# Patient Record
Sex: FEMALE | Race: WHITE | NOT HISPANIC OR LATINO | Employment: UNEMPLOYED | URBAN - METROPOLITAN AREA
[De-identification: names, ages, dates, MRNs, and addresses within clinical notes are randomized per-mention and may not be internally consistent; named-entity substitution may affect disease eponyms.]

---

## 2024-01-20 ENCOUNTER — HOSPITAL ENCOUNTER (INPATIENT)
Facility: HOSPITAL | Age: 74
LOS: 2 days | Discharge: HOME | DRG: 321 | End: 2024-01-22
Attending: ANESTHESIOLOGY | Admitting: INTERNAL MEDICINE
Payer: MEDICARE

## 2024-01-20 ENCOUNTER — APPOINTMENT (OUTPATIENT)
Dept: RADIOLOGY | Facility: HOSPITAL | Age: 74
DRG: 321 | End: 2024-01-20
Payer: MEDICARE

## 2024-01-20 ENCOUNTER — APPOINTMENT (OUTPATIENT)
Dept: CARDIOLOGY | Facility: HOSPITAL | Age: 74
DRG: 321 | End: 2024-01-20
Payer: MEDICARE

## 2024-01-20 ENCOUNTER — HOSPITAL ENCOUNTER (EMERGENCY)
Facility: HOSPITAL | Age: 74
Discharge: OTHER INSTITUTION | DRG: 321 | End: 2024-01-20
Attending: STUDENT IN AN ORGANIZED HEALTH CARE EDUCATION/TRAINING PROGRAM
Payer: MEDICARE

## 2024-01-20 VITALS
TEMPERATURE: 98.2 F | HEART RATE: 98 BPM | WEIGHT: 180 LBS | RESPIRATION RATE: 22 BRPM | SYSTOLIC BLOOD PRESSURE: 112 MMHG | BODY MASS INDEX: 30.73 KG/M2 | HEIGHT: 64 IN | DIASTOLIC BLOOD PRESSURE: 77 MMHG | OXYGEN SATURATION: 96 %

## 2024-01-20 DIAGNOSIS — I21.4 NSTEMI (NON-ST ELEVATED MYOCARDIAL INFARCTION) (MULTI): Primary | ICD-10-CM

## 2024-01-20 DIAGNOSIS — I24.9 ACUTE CORONARY SYNDROME (MULTI): ICD-10-CM

## 2024-01-20 DIAGNOSIS — I25.112 ATHEROSCLEROTIC HEART DISEASE OF NATIVE CORONARY ARTERY WITH REFRACTORY ANGINA PECTORIS (CMS-HCC): ICD-10-CM

## 2024-01-20 DIAGNOSIS — E78.2 MIXED HYPERLIPIDEMIA: ICD-10-CM

## 2024-01-20 DIAGNOSIS — I22.2 SUBSEQUENT NON-ST ELEVATION (NSTEMI) MYOCARDIAL INFARCTION (MULTI): ICD-10-CM

## 2024-01-20 DIAGNOSIS — D68.59 PROTEIN S DEFICIENCY (MULTI): ICD-10-CM

## 2024-01-20 DIAGNOSIS — I25.110 CORONARY ARTERY DISEASE INVOLVING NATIVE CORONARY ARTERY OF NATIVE HEART WITH UNSTABLE ANGINA PECTORIS (MULTI): ICD-10-CM

## 2024-01-20 LAB
ALBUMIN SERPL-MCNC: 4.6 G/DL (ref 3.5–5)
ALP BLD-CCNC: 103 U/L (ref 35–125)
ALT SERPL-CCNC: 20 U/L (ref 5–40)
ANION GAP SERPL CALC-SCNC: 11 MMOL/L
APTT PPP: 33.3 SECONDS (ref 22–32.5)
APTT PPP: 92.7 SECONDS (ref 22–32.5)
AST SERPL-CCNC: 24 U/L (ref 5–40)
BASOPHILS # BLD AUTO: 0.03 X10*3/UL (ref 0–0.1)
BASOPHILS NFR BLD AUTO: 0.7 %
BILIRUB SERPL-MCNC: 0.3 MG/DL (ref 0.1–1.2)
BUN SERPL-MCNC: 16 MG/DL (ref 8–25)
CALCIUM SERPL-MCNC: 10 MG/DL (ref 8.5–10.4)
CHLORIDE SERPL-SCNC: 98 MMOL/L (ref 97–107)
CO2 SERPL-SCNC: 26 MMOL/L (ref 24–31)
CREAT SERPL-MCNC: 0.8 MG/DL (ref 0.4–1.6)
EGFRCR SERPLBLD CKD-EPI 2021: 78 ML/MIN/1.73M*2
EOSINOPHIL # BLD AUTO: 0.06 X10*3/UL (ref 0–0.4)
EOSINOPHIL NFR BLD AUTO: 1.4 %
ERYTHROCYTE [DISTWIDTH] IN BLOOD BY AUTOMATED COUNT: 12.3 % (ref 11.5–14.5)
GLUCOSE SERPL-MCNC: 137 MG/DL (ref 65–99)
HCT VFR BLD AUTO: 44.5 % (ref 36–46)
HGB BLD-MCNC: 14.9 G/DL (ref 12–16)
IMM GRANULOCYTES # BLD AUTO: 0.01 X10*3/UL (ref 0–0.5)
IMM GRANULOCYTES NFR BLD AUTO: 0.2 % (ref 0–0.9)
INR PPP: 1.3 (ref 0.9–1.2)
LYMPHOCYTES # BLD AUTO: 1.46 X10*3/UL (ref 0.8–3)
LYMPHOCYTES NFR BLD AUTO: 33.1 %
MCH RBC QN AUTO: 32.5 PG (ref 26–34)
MCHC RBC AUTO-ENTMCNC: 33.5 G/DL (ref 32–36)
MCV RBC AUTO: 97 FL (ref 80–100)
MONOCYTES # BLD AUTO: 0.32 X10*3/UL (ref 0.05–0.8)
MONOCYTES NFR BLD AUTO: 7.3 %
NEUTROPHILS # BLD AUTO: 2.53 X10*3/UL (ref 1.6–5.5)
NEUTROPHILS NFR BLD AUTO: 57.3 %
NRBC BLD-RTO: 0 /100 WBCS (ref 0–0)
PLATELET # BLD AUTO: 147 X10*3/UL (ref 150–450)
POTASSIUM SERPL-SCNC: 4.2 MMOL/L (ref 3.4–5.1)
PROT SERPL-MCNC: 7.8 G/DL (ref 5.9–7.9)
PROTHROMBIN TIME: 13.7 SECONDS (ref 9.3–12.7)
RBC # BLD AUTO: 4.59 X10*6/UL (ref 4–5.2)
SODIUM SERPL-SCNC: 135 MMOL/L (ref 133–145)
TROPONIN T SERPL-MCNC: 312 NG/L
TROPONIN T SERPL-MCNC: 929 NG/L
TROPONIN T SERPL-MCNC: 974 NG/L
WBC # BLD AUTO: 4.4 X10*3/UL (ref 4.4–11.3)

## 2024-01-20 PROCEDURE — 85025 COMPLETE CBC W/AUTO DIFF WBC: CPT | Performed by: STUDENT IN AN ORGANIZED HEALTH CARE EDUCATION/TRAINING PROGRAM

## 2024-01-20 PROCEDURE — 2500000004 HC RX 250 GENERAL PHARMACY W/ HCPCS (ALT 636 FOR OP/ED): Performed by: NURSE PRACTITIONER

## 2024-01-20 PROCEDURE — 84484 ASSAY OF TROPONIN QUANT: CPT | Performed by: INTERNAL MEDICINE

## 2024-01-20 PROCEDURE — 85610 PROTHROMBIN TIME: CPT | Performed by: INTERNAL MEDICINE

## 2024-01-20 PROCEDURE — 85730 THROMBOPLASTIN TIME PARTIAL: CPT | Performed by: NURSE PRACTITIONER

## 2024-01-20 PROCEDURE — 2500000004 HC RX 250 GENERAL PHARMACY W/ HCPCS (ALT 636 FOR OP/ED): Performed by: STUDENT IN AN ORGANIZED HEALTH CARE EDUCATION/TRAINING PROGRAM

## 2024-01-20 PROCEDURE — C9113 INJ PANTOPRAZOLE SODIUM, VIA: HCPCS | Performed by: INTERNAL MEDICINE

## 2024-01-20 PROCEDURE — 71275 CT ANGIOGRAPHY CHEST: CPT

## 2024-01-20 PROCEDURE — 36415 COLL VENOUS BLD VENIPUNCTURE: CPT | Performed by: STUDENT IN AN ORGANIZED HEALTH CARE EDUCATION/TRAINING PROGRAM

## 2024-01-20 PROCEDURE — 84484 ASSAY OF TROPONIN QUANT: CPT | Performed by: STUDENT IN AN ORGANIZED HEALTH CARE EDUCATION/TRAINING PROGRAM

## 2024-01-20 PROCEDURE — 93005 ELECTROCARDIOGRAM TRACING: CPT

## 2024-01-20 PROCEDURE — 36415 COLL VENOUS BLD VENIPUNCTURE: CPT | Performed by: NURSE PRACTITIONER

## 2024-01-20 PROCEDURE — 2500000001 HC RX 250 WO HCPCS SELF ADMINISTERED DRUGS (ALT 637 FOR MEDICARE OP): Performed by: STUDENT IN AN ORGANIZED HEALTH CARE EDUCATION/TRAINING PROGRAM

## 2024-01-20 PROCEDURE — 99285 EMERGENCY DEPT VISIT HI MDM: CPT | Performed by: STUDENT IN AN ORGANIZED HEALTH CARE EDUCATION/TRAINING PROGRAM

## 2024-01-20 PROCEDURE — 96374 THER/PROPH/DIAG INJ IV PUSH: CPT | Mod: 59

## 2024-01-20 PROCEDURE — 2500000001 HC RX 250 WO HCPCS SELF ADMINISTERED DRUGS (ALT 637 FOR MEDICARE OP): Performed by: INTERNAL MEDICINE

## 2024-01-20 PROCEDURE — 2500000001 HC RX 250 WO HCPCS SELF ADMINISTERED DRUGS (ALT 637 FOR MEDICARE OP): Performed by: NURSE PRACTITIONER

## 2024-01-20 PROCEDURE — 80053 COMPREHEN METABOLIC PANEL: CPT | Performed by: STUDENT IN AN ORGANIZED HEALTH CARE EDUCATION/TRAINING PROGRAM

## 2024-01-20 PROCEDURE — 2500000005 HC RX 250 GENERAL PHARMACY W/O HCPCS: Performed by: INTERNAL MEDICINE

## 2024-01-20 PROCEDURE — 99291 CRITICAL CARE FIRST HOUR: CPT | Mod: 25

## 2024-01-20 PROCEDURE — 2060000001 HC INTERMEDIATE ICU ROOM DAILY

## 2024-01-20 PROCEDURE — 71045 X-RAY EXAM CHEST 1 VIEW: CPT

## 2024-01-20 PROCEDURE — 93010 ELECTROCARDIOGRAM REPORT: CPT | Performed by: INTERNAL MEDICINE

## 2024-01-20 PROCEDURE — 2550000001 HC RX 255 CONTRASTS: Performed by: STUDENT IN AN ORGANIZED HEALTH CARE EDUCATION/TRAINING PROGRAM

## 2024-01-20 PROCEDURE — 99222 1ST HOSP IP/OBS MODERATE 55: CPT | Performed by: INTERNAL MEDICINE

## 2024-01-20 PROCEDURE — 2500000004 HC RX 250 GENERAL PHARMACY W/ HCPCS (ALT 636 FOR OP/ED): Performed by: INTERNAL MEDICINE

## 2024-01-20 RX ORDER — ACETAMINOPHEN 160 MG/5ML
650 SOLUTION ORAL EVERY 4 HOURS PRN
Status: DISCONTINUED | OUTPATIENT
Start: 2024-01-20 | End: 2024-01-22 | Stop reason: HOSPADM

## 2024-01-20 RX ORDER — HEPARIN SODIUM 5000 [USP'U]/ML
2000-4000 INJECTION, SOLUTION INTRAVENOUS; SUBCUTANEOUS AS NEEDED
Status: DISCONTINUED | OUTPATIENT
Start: 2024-01-20 | End: 2024-01-20 | Stop reason: HOSPADM

## 2024-01-20 RX ORDER — HEPARIN SODIUM 10000 [USP'U]/100ML
0-4000 INJECTION, SOLUTION INTRAVENOUS CONTINUOUS
Status: DISCONTINUED | OUTPATIENT
Start: 2024-01-20 | End: 2024-01-20 | Stop reason: HOSPADM

## 2024-01-20 RX ORDER — FENTANYL CITRATE 50 UG/ML
50 INJECTION, SOLUTION INTRAMUSCULAR; INTRAVENOUS ONCE
Status: COMPLETED | OUTPATIENT
Start: 2024-01-20 | End: 2024-01-20

## 2024-01-20 RX ORDER — ATORVASTATIN CALCIUM 80 MG/1
80 TABLET, FILM COATED ORAL NIGHTLY
Status: DISCONTINUED | OUTPATIENT
Start: 2024-01-20 | End: 2024-01-21 | Stop reason: SDUPTHER

## 2024-01-20 RX ORDER — HEPARIN SODIUM 5000 [USP'U]/ML
2000-4000 INJECTION, SOLUTION INTRAVENOUS; SUBCUTANEOUS AS NEEDED
Status: DISCONTINUED | OUTPATIENT
Start: 2024-01-20 | End: 2024-01-22 | Stop reason: HOSPADM

## 2024-01-20 RX ORDER — HEPARIN SODIUM 10000 [USP'U]/100ML
0-4000 INJECTION, SOLUTION INTRAVENOUS CONTINUOUS
Status: DISCONTINUED | OUTPATIENT
Start: 2024-01-20 | End: 2024-01-21

## 2024-01-20 RX ORDER — ACETAMINOPHEN 650 MG/1
650 SUPPOSITORY RECTAL EVERY 4 HOURS PRN
Status: DISCONTINUED | OUTPATIENT
Start: 2024-01-20 | End: 2024-01-22 | Stop reason: HOSPADM

## 2024-01-20 RX ORDER — NITROGLYCERIN 0.4 MG/1
0.4 TABLET SUBLINGUAL EVERY 5 MIN PRN
Status: DISCONTINUED | OUTPATIENT
Start: 2024-01-20 | End: 2024-01-20 | Stop reason: HOSPADM

## 2024-01-20 RX ORDER — NITROGLYCERIN 0.3 MG/1
0.3 TABLET SUBLINGUAL EVERY 5 MIN PRN
Status: DISCONTINUED | OUTPATIENT
Start: 2024-01-20 | End: 2024-01-20

## 2024-01-20 RX ORDER — ACETAMINOPHEN 500 MG
5 TABLET ORAL NIGHTLY PRN
Status: DISCONTINUED | OUTPATIENT
Start: 2024-01-20 | End: 2024-01-22 | Stop reason: HOSPADM

## 2024-01-20 RX ORDER — METOPROLOL TARTRATE 25 MG/1
25 TABLET, FILM COATED ORAL EVERY 12 HOURS SCHEDULED
Status: DISCONTINUED | OUTPATIENT
Start: 2024-01-20 | End: 2024-01-21

## 2024-01-20 RX ORDER — ASPIRIN 325 MG
325 TABLET ORAL ONCE
Status: COMPLETED | OUTPATIENT
Start: 2024-01-20 | End: 2024-01-20

## 2024-01-20 RX ORDER — PANTOPRAZOLE SODIUM 40 MG/10ML
40 INJECTION, POWDER, LYOPHILIZED, FOR SOLUTION INTRAVENOUS DAILY
Status: DISCONTINUED | OUTPATIENT
Start: 2024-01-20 | End: 2024-01-22 | Stop reason: ALTCHOICE

## 2024-01-20 RX ORDER — NITROGLYCERIN 20 MG/100ML
1-30 INJECTION INTRAVENOUS CONTINUOUS
Status: DISCONTINUED | OUTPATIENT
Start: 2024-01-20 | End: 2024-01-21

## 2024-01-20 RX ORDER — ACETAMINOPHEN 325 MG/1
650 TABLET ORAL EVERY 4 HOURS PRN
Status: DISCONTINUED | OUTPATIENT
Start: 2024-01-20 | End: 2024-01-22 | Stop reason: HOSPADM

## 2024-01-20 RX ORDER — HEPARIN SODIUM 5000 [USP'U]/ML
4000 INJECTION, SOLUTION INTRAVENOUS; SUBCUTANEOUS ONCE
Status: DISCONTINUED | OUTPATIENT
Start: 2024-01-20 | End: 2024-01-22 | Stop reason: ALTCHOICE

## 2024-01-20 RX ORDER — HEPARIN SODIUM 5000 [USP'U]/ML
4000 INJECTION, SOLUTION INTRAVENOUS; SUBCUTANEOUS ONCE
Status: COMPLETED | OUTPATIENT
Start: 2024-01-20 | End: 2024-01-20

## 2024-01-20 RX ORDER — NAPROXEN SODIUM 220 MG/1
81 TABLET, FILM COATED ORAL DAILY
Status: DISCONTINUED | OUTPATIENT
Start: 2024-01-21 | End: 2024-01-21 | Stop reason: SDUPTHER

## 2024-01-20 RX ORDER — MORPHINE SULFATE 2 MG/ML
2 INJECTION, SOLUTION INTRAMUSCULAR; INTRAVENOUS EVERY 5 MIN PRN
Status: DISCONTINUED | OUTPATIENT
Start: 2024-01-20 | End: 2024-01-22 | Stop reason: HOSPADM

## 2024-01-20 RX ORDER — NITROGLYCERIN 0.4 MG/1
0.4 TABLET SUBLINGUAL EVERY 5 MIN PRN
Status: COMPLETED | OUTPATIENT
Start: 2024-01-20 | End: 2024-01-20

## 2024-01-20 RX ADMIN — ASPIRIN 325 MG: 325 TABLET ORAL at 13:49

## 2024-01-20 RX ADMIN — NITROGLYCERIN 5 MCG/MIN: 20 INJECTION INTRAVENOUS at 20:38

## 2024-01-20 RX ADMIN — TICAGRELOR 180 MG: 90 TABLET ORAL at 14:38

## 2024-01-20 RX ADMIN — METOPROLOL TARTRATE 25 MG: 25 TABLET, FILM COATED ORAL at 20:50

## 2024-01-20 RX ADMIN — HEPARIN SODIUM 1000 UNITS/HR: 10000 INJECTION, SOLUTION INTRAVENOUS at 20:38

## 2024-01-20 RX ADMIN — PANTOPRAZOLE SODIUM 40 MG: 40 INJECTION, POWDER, FOR SOLUTION INTRAVENOUS at 20:38

## 2024-01-20 RX ADMIN — NITROGLYCERIN 0.4 MG: 0.4 TABLET SUBLINGUAL at 17:12

## 2024-01-20 RX ADMIN — FENTANYL CITRATE 50 MCG: 50 INJECTION INTRAMUSCULAR; INTRAVENOUS at 15:20

## 2024-01-20 RX ADMIN — ATORVASTATIN CALCIUM 80 MG: 80 TABLET, FILM COATED ORAL at 20:38

## 2024-01-20 RX ADMIN — NITROGLYCERIN 0.4 MG: 0.4 TABLET SUBLINGUAL at 14:39

## 2024-01-20 RX ADMIN — IOHEXOL 75 ML: 350 INJECTION, SOLUTION INTRAVENOUS at 14:16

## 2024-01-20 RX ADMIN — MORPHINE SULFATE 2 MG: 2 INJECTION, SOLUTION INTRAMUSCULAR; INTRAVENOUS at 20:47

## 2024-01-20 RX ADMIN — HEPARIN SODIUM 4000 UNITS: 5000 INJECTION, SOLUTION INTRAVENOUS; SUBCUTANEOUS at 14:15

## 2024-01-20 RX ADMIN — NITROGLYCERIN 0.5 INCH: 20 OINTMENT TOPICAL at 14:26

## 2024-01-20 RX ADMIN — HEPARIN SODIUM 1000 UNITS/HR: 10000 INJECTION, SOLUTION INTRAVENOUS at 14:18

## 2024-01-20 ASSESSMENT — COLUMBIA-SUICIDE SEVERITY RATING SCALE - C-SSRS
6. HAVE YOU EVER DONE ANYTHING, STARTED TO DO ANYTHING, OR PREPARED TO DO ANYTHING TO END YOUR LIFE?: NO
2. HAVE YOU ACTUALLY HAD ANY THOUGHTS OF KILLING YOURSELF?: NO
1. IN THE PAST MONTH, HAVE YOU WISHED YOU WERE DEAD OR WISHED YOU COULD GO TO SLEEP AND NOT WAKE UP?: NO

## 2024-01-20 ASSESSMENT — PAIN SCALES - GENERAL
PAINLEVEL_OUTOF10: 5 - MODERATE PAIN
PAINLEVEL_OUTOF10: 7
PAINLEVEL_OUTOF10: 4
PAINLEVEL_OUTOF10: 6

## 2024-01-20 ASSESSMENT — PAIN DESCRIPTION - LOCATION: LOCATION: CHEST

## 2024-01-20 ASSESSMENT — PAIN DESCRIPTION - DESCRIPTORS: DESCRIPTORS: PRESSURE

## 2024-01-20 ASSESSMENT — PAIN - FUNCTIONAL ASSESSMENT
PAIN_FUNCTIONAL_ASSESSMENT: 0-10

## 2024-01-20 ASSESSMENT — PAIN DESCRIPTION - ORIENTATION: ORIENTATION: MID

## 2024-01-20 NOTE — Clinical Note
Vessel: circumflex. Stent inserted. Inflation 1: Pressure = 14 kerri; Duration = 10 sec. Inflation 2: Pressure = 16 kerri; Duration = 5 sec.

## 2024-01-20 NOTE — ED PROVIDER NOTES
HPI   Chief Complaint   Patient presents with    Chest Pain     Patient woke up at 1100 with mid chest pain, mild SOB. Hx of PE.        HPI                    [place] Coma Scale Score: 15                  Patient History   No past medical history on file.  No past surgical history on file.  No family history on file.  Social History     Tobacco Use    Smoking status: Not on file    Smokeless tobacco: Not on file   Substance Use Topics    Alcohol use: Not on file    Drug use: Not on file       Physical Exam   ED Triage Vitals [01/20/24 1340]   Temp Heart Rate Respirations BP   36.8 °C (98.2 °F) 82 14 (!) 150/93      Pulse Ox Temp Source Heart Rate Source Patient Position   98 % Oral Monitor Sitting      BP Location FiO2 (%)     Left arm --       Physical Exam  CONSTITUTIONAL: Vital signs reviewed as charted, well-developed and in no distress  Eyes: Extraocular muscles are intact. Pupils equal round and reactive to light. Conjunctiva are pink.    ENT: Mucous membranes are moist. Tongue in the midline. Pharynx was without erythema or exudates, uvula midline  LUNGS: Breath sounds equal and clear to auscultation. Good air exchange, no wheezes rales or retractions, pulse oximetry is charted.  HEART: Regular rate and rhythm without murmur thrill or rub, strong tones, auscultation is normal.  ABDOMEN: Soft and nontender without guarding rebound rigidity or mass. Bowel sounds are present and normal in all quadrants. There is no palpable masses or aneurysms identified. No hepatosplenomegaly, normal abdominal exam.  Neuro: The patient is awake, alert and oriented ×3. Moving all 4 extremities and answering questions appropriately.   MUSCULOSKELETAL: The calves are nontender to palpation. Full gross active range of motion.   PSYCH: Awake alert oriented, normal mood and affect.  Skin:  Dry, normal color, warm to the touch, no rash present.      ED Course & MDM   ED Course as of 01/20/24 1726   Sat Jan 20, 2024   1342 Performed at   1338, HR of 94, NSR, NAD, QTc 442.  ST elevation in I, aVL, ST depression in III, aVF.    Reviewed and interpreted by me at time performed   [JM]   1348 Spoke with Dr. Alas [JM]   1352 Spoke with Dr. Alas again after repeat EKG. Recommending Sublingual nitro, nitroglycerin patch, heparin protocol, rule out PE.  Recommending admit and will see patient.  [JM]   1352 Performed at  1350, HR of 98, NSR, NAD, QTc 436, no sign of STEMI or NSTEMI, no Q wave or T wave abnormality noted.    Reviewed and interpreted by me at time performed   [JM]   1357 Case was discussed x 2 with cardiology repeat EKG was performed they are canceling the code STEMI and recommending heparin protocol, ruling out PE, nitroglycerin patch, sublingual nitro and admitting the patient to the hospital. [RJ]   1431 Troponin T, High Sensitivity(!!): 312 [JM]      ED Course User Index  [JM] Suyapa Lara MD  [RJ] HEIKE Moreno-CNP         Diagnoses as of 01/20/24 1726   NSTEMI (non-ST elevated myocardial infarction) (CMS/[facility])       Medical Decision Making  History obtained from: patient    Vital signs, nursing notes, current medications, past medical history, Surgical history, allergies, social history, family History were reviewed.         HPI:  Patient 73-year-old female history of protein S deficiency on anticoagulation presenting emergency room today stating she developed chest pain around 11 AM and it is progressively worsening.  She describes it as a sharp stabbing pain in the center of her chest.  Code STEMI protocol was initiated from triage.  She denies any dyspnea or diaphoresis.  Denies history of cardiac disease.  Denies nausea vomiting diarrhea.      10 point ROS was reviewed and negative except Noted above in HPI.  DDX: as listed above      Medications administered during this visit (name and route): ###      MDM Summary/considerations:  I spoke with Dr. Witt. We thoroughly discussed the history, physical exam,  laboratory and imaging studies, as well as, emergency department course. Based upon that discussion, we've decided to admit for further observation and evaluation of their chest pain.  As I have deemed necessary from their history, physical, and studies, I have considered and evaluated for the following diagnoses: ACUTE CORONARY SYNDROME, PERICARDIAL TAMPONADE, PNEUMOTHORAX, P ULMONARY EMBOLISM, and THORACIC DISSECTION.     Patient noted to have acute NSTEMI with initial troponin of 312, repeat of 929.  Patient was initially placed on heparin low-level protocol, given Brilinta, aspirin, and Nitropaste and admitted for further evaluation and care.  CT angio shows possibility of a subsegmental PE but no central embolus.  I did initially speak with the stepdown hospitalist who thought maybe the patient might be more appropriate for ICU.  I did speak with the intensivist who agreed to admit the patient for further evaluation and care.    I saw this patient in conjunction with Dr. Lara, please see her supervision note.    After reviewing patient's comorbidities, severity of history of presenting illness, labs and imaging if obtained in conjunction with physical exam and course in emergency department, deemed to have potential for deterioration/progression of symptoms that could lead to multiple morbidities or mortality, decision made that patient requires further observation/evaluation/treatment and patient admitted to appropriate service, patient/family understand and agree with plan.              Critical Care: CRITICAL CARE NOTE     The patient was reevaluated/re-examined multiple times during the visit. Critical care time includes management at bedside, discussion with other providers and consultants, family counseling and answering questions, and documentation. Care involves decision making of high complexity to assess, manipulate, and support vital organ system failure and/or to prevent further life threatening  deterioration of the patient's condition. Failure to initiate these interventions on an urgent basis would likely result in sudden, clinically significant or life threatening deterioration in the patient's condition of NSTEMI       Critical care time total at least 47 minutes of non concurrent critical care time provided by myself. This did not include any separate billable procedures.              Prescriptions provided include: none    This chart was completed using voice recognition transcription software. Please excuse any errors of transcription including grammatical, punctuation, syntax and spelling errors.  Please contact me with any questions regarding this chart.    Procedure  Procedures     Marvel Cruz, HEIKE-TOÑO  01/20/24 7828

## 2024-01-20 NOTE — Clinical Note
Inflation 1: Pressure = 10 kerri; Duration = 5 sec. Inflation 2: Pressure = 10 kerri; Duration = 5 sec. Inflation 3: Pressure = 10 kerri; Duration = 5 sec.

## 2024-01-21 PROBLEM — I10 HYPERTENSION: Status: ACTIVE | Noted: 2024-01-21

## 2024-01-21 PROBLEM — D68.59 PROTEIN S DEFICIENCY (MULTI): Status: ACTIVE | Noted: 2023-09-15

## 2024-01-21 PROBLEM — E78.5 HYPERLIPIDEMIA: Status: ACTIVE | Noted: 2024-01-21

## 2024-01-21 PROBLEM — E03.9 HYPOTHYROIDISM, ACQUIRED: Status: ACTIVE | Noted: 2023-09-15

## 2024-01-21 LAB
ACT BLD: 172 SEC (ref 89–169)
ANION GAP SERPL CALC-SCNC: 14 MMOL/L
APTT PPP: 32 SECONDS (ref 22–32.5)
APTT PPP: 41.3 SECONDS (ref 22–32.5)
BASOPHILS # BLD AUTO: 0.03 X10*3/UL (ref 0–0.1)
BASOPHILS NFR BLD AUTO: 0.6 %
BUN SERPL-MCNC: 14 MG/DL (ref 8–25)
CALCIUM SERPL-MCNC: 9.1 MG/DL (ref 8.5–10.4)
CHLORIDE SERPL-SCNC: 101 MMOL/L (ref 97–107)
CHOLEST SERPL-MCNC: 214 MG/DL (ref 133–200)
CHOLEST/HDLC SERPL: 3.9 {RATIO}
CO2 SERPL-SCNC: 22 MMOL/L (ref 24–31)
CREAT SERPL-MCNC: 0.7 MG/DL (ref 0.4–1.6)
EGFRCR SERPLBLD CKD-EPI 2021: >90 ML/MIN/1.73M*2
EOSINOPHIL # BLD AUTO: 0.03 X10*3/UL (ref 0–0.4)
EOSINOPHIL NFR BLD AUTO: 0.6 %
ERYTHROCYTE [DISTWIDTH] IN BLOOD BY AUTOMATED COUNT: 12.5 % (ref 11.5–14.5)
ERYTHROCYTE [DISTWIDTH] IN BLOOD BY AUTOMATED COUNT: 12.7 % (ref 11.5–14.5)
GLUCOSE SERPL-MCNC: 124 MG/DL (ref 65–99)
HCT VFR BLD AUTO: 38.4 % (ref 36–46)
HCT VFR BLD AUTO: 41.4 % (ref 36–46)
HDLC SERPL-MCNC: 55 MG/DL
HGB BLD-MCNC: 12.8 G/DL (ref 12–16)
HGB BLD-MCNC: 13.8 G/DL (ref 12–16)
HOLD SPECIMEN: NORMAL
HOLD SPECIMEN: NORMAL
IMM GRANULOCYTES # BLD AUTO: 0.01 X10*3/UL (ref 0–0.5)
IMM GRANULOCYTES NFR BLD AUTO: 0.2 % (ref 0–0.9)
INR PPP: 1.1 (ref 0.9–1.2)
LDLC SERPL CALC-MCNC: 131 MG/DL (ref 65–130)
LYMPHOCYTES # BLD AUTO: 0.94 X10*3/UL (ref 0.8–3)
LYMPHOCYTES NFR BLD AUTO: 19 %
MCH RBC QN AUTO: 32.1 PG (ref 26–34)
MCH RBC QN AUTO: 32.5 PG (ref 26–34)
MCHC RBC AUTO-ENTMCNC: 33.3 G/DL (ref 32–36)
MCHC RBC AUTO-ENTMCNC: 33.3 G/DL (ref 32–36)
MCV RBC AUTO: 96 FL (ref 80–100)
MCV RBC AUTO: 98 FL (ref 80–100)
MONOCYTES # BLD AUTO: 0.33 X10*3/UL (ref 0.05–0.8)
MONOCYTES NFR BLD AUTO: 6.7 %
NEUTROPHILS # BLD AUTO: 3.62 X10*3/UL (ref 1.6–5.5)
NEUTROPHILS NFR BLD AUTO: 72.9 %
NRBC BLD-RTO: 0 /100 WBCS (ref 0–0)
NRBC BLD-RTO: 0 /100 WBCS (ref 0–0)
PLATELET # BLD AUTO: 145 X10*3/UL (ref 150–450)
PLATELET # BLD AUTO: 158 X10*3/UL (ref 150–450)
POTASSIUM SERPL-SCNC: 4.1 MMOL/L (ref 3.4–5.1)
PROTHROMBIN TIME: 11.3 SECONDS (ref 9.3–12.7)
RBC # BLD AUTO: 3.94 X10*6/UL (ref 4–5.2)
RBC # BLD AUTO: 4.3 X10*6/UL (ref 4–5.2)
SODIUM SERPL-SCNC: 137 MMOL/L (ref 133–145)
TRIGL SERPL-MCNC: 142 MG/DL (ref 40–150)
WBC # BLD AUTO: 5 X10*3/UL (ref 4.4–11.3)
WBC # BLD AUTO: 5.6 X10*3/UL (ref 4.4–11.3)

## 2024-01-21 PROCEDURE — 85610 PROTHROMBIN TIME: CPT | Performed by: INTERNAL MEDICINE

## 2024-01-21 PROCEDURE — 85347 COAGULATION TIME ACTIVATED: CPT

## 2024-01-21 PROCEDURE — 2500000001 HC RX 250 WO HCPCS SELF ADMINISTERED DRUGS (ALT 637 FOR MEDICARE OP): Performed by: INTERNAL MEDICINE

## 2024-01-21 PROCEDURE — 93458 L HRT ARTERY/VENTRICLE ANGIO: CPT | Performed by: INTERNAL MEDICINE

## 2024-01-21 PROCEDURE — 2700000047 HC OR 270 NO HCPCS: Performed by: INTERNAL MEDICINE

## 2024-01-21 PROCEDURE — 2500000004 HC RX 250 GENERAL PHARMACY W/ HCPCS (ALT 636 FOR OP/ED): Performed by: INTERNAL MEDICINE

## 2024-01-21 PROCEDURE — C1887 CATHETER, GUIDING: HCPCS | Performed by: INTERNAL MEDICINE

## 2024-01-21 PROCEDURE — B2151ZZ FLUOROSCOPY OF LEFT HEART USING LOW OSMOLAR CONTRAST: ICD-10-PCS | Performed by: INTERNAL MEDICINE

## 2024-01-21 PROCEDURE — 36415 COLL VENOUS BLD VENIPUNCTURE: CPT | Performed by: INTERNAL MEDICINE

## 2024-01-21 PROCEDURE — 85025 COMPLETE CBC W/AUTO DIFF WBC: CPT | Performed by: STUDENT IN AN ORGANIZED HEALTH CARE EDUCATION/TRAINING PROGRAM

## 2024-01-21 PROCEDURE — 80048 BASIC METABOLIC PNL TOTAL CA: CPT | Performed by: STUDENT IN AN ORGANIZED HEALTH CARE EDUCATION/TRAINING PROGRAM

## 2024-01-21 PROCEDURE — 0270346 DILATION OF CORONARY ARTERY, ONE ARTERY, BIFURCATION, WITH DRUG-ELUTING INTRALUMINAL DEVICE, PERCUTANEOUS APPROACH: ICD-10-PCS | Performed by: INTERNAL MEDICINE

## 2024-01-21 PROCEDURE — C1760 CLOSURE DEV, VASC: HCPCS | Performed by: INTERNAL MEDICINE

## 2024-01-21 PROCEDURE — C1769 GUIDE WIRE: HCPCS | Performed by: INTERNAL MEDICINE

## 2024-01-21 PROCEDURE — 80061 LIPID PANEL: CPT | Performed by: INTERNAL MEDICINE

## 2024-01-21 PROCEDURE — G0269 OCCLUSIVE DEVICE IN VEIN ART: HCPCS | Mod: TC | Performed by: INTERNAL MEDICINE

## 2024-01-21 PROCEDURE — 99152 MOD SED SAME PHYS/QHP 5/>YRS: CPT | Performed by: INTERNAL MEDICINE

## 2024-01-21 PROCEDURE — 99153 MOD SED SAME PHYS/QHP EA: CPT | Performed by: INTERNAL MEDICINE

## 2024-01-21 PROCEDURE — C9113 INJ PANTOPRAZOLE SODIUM, VIA: HCPCS | Performed by: INTERNAL MEDICINE

## 2024-01-21 PROCEDURE — 2720000007 HC OR 272 NO HCPCS: Performed by: INTERNAL MEDICINE

## 2024-01-21 PROCEDURE — B2111ZZ FLUOROSCOPY OF MULTIPLE CORONARY ARTERIES USING LOW OSMOLAR CONTRAST: ICD-10-PCS | Performed by: INTERNAL MEDICINE

## 2024-01-21 PROCEDURE — C9600 PERC DRUG-EL COR STENT SING: HCPCS | Performed by: INTERNAL MEDICINE

## 2024-01-21 PROCEDURE — 2550000001 HC RX 255 CONTRASTS: Performed by: INTERNAL MEDICINE

## 2024-01-21 PROCEDURE — 85730 THROMBOPLASTIN TIME PARTIAL: CPT | Performed by: INTERNAL MEDICINE

## 2024-01-21 PROCEDURE — C1725 CATH, TRANSLUMIN NON-LASER: HCPCS | Performed by: INTERNAL MEDICINE

## 2024-01-21 PROCEDURE — 4A023N7 MEASUREMENT OF CARDIAC SAMPLING AND PRESSURE, LEFT HEART, PERCUTANEOUS APPROACH: ICD-10-PCS | Performed by: INTERNAL MEDICINE

## 2024-01-21 PROCEDURE — 2780000003 HC OR 278 NO HCPCS: Performed by: INTERNAL MEDICINE

## 2024-01-21 PROCEDURE — C1874 STENT, COATED/COV W/DEL SYS: HCPCS | Performed by: INTERNAL MEDICINE

## 2024-01-21 PROCEDURE — 2060000001 HC INTERMEDIATE ICU ROOM DAILY

## 2024-01-21 PROCEDURE — 92928 PRQ TCAT PLMT NTRAC ST 1 LES: CPT | Performed by: INTERNAL MEDICINE

## 2024-01-21 PROCEDURE — 2500000005 HC RX 250 GENERAL PHARMACY W/O HCPCS: Performed by: INTERNAL MEDICINE

## 2024-01-21 PROCEDURE — 85027 COMPLETE CBC AUTOMATED: CPT | Performed by: INTERNAL MEDICINE

## 2024-01-21 DEVICE — STENT ONYXNG25022UX ONYX 2.50X22RX
Type: IMPLANTABLE DEVICE | Site: CHEST | Status: FUNCTIONAL
Brand: ONYX FRONTIER™

## 2024-01-21 RX ORDER — LEVOTHYROXINE SODIUM 75 UG/1
75 TABLET ORAL 2 TIMES WEEKLY
COMMUNITY
Start: 2023-10-23 | End: 2024-04-20

## 2024-01-21 RX ORDER — DEXTROSE MONOHYDRATE AND SODIUM CHLORIDE 5; .45 G/100ML; G/100ML
100 INJECTION, SOLUTION INTRAVENOUS CONTINUOUS
Status: DISCONTINUED | OUTPATIENT
Start: 2024-01-21 | End: 2024-01-21

## 2024-01-21 RX ORDER — FAMOTIDINE 20 MG/1
20 TABLET, FILM COATED ORAL ONCE
Status: COMPLETED | OUTPATIENT
Start: 2024-01-21 | End: 2024-01-21

## 2024-01-21 RX ORDER — NAPROXEN SODIUM 220 MG/1
81 TABLET, FILM COATED ORAL DAILY
Status: DISCONTINUED | OUTPATIENT
Start: 2024-01-21 | End: 2024-01-22 | Stop reason: HOSPADM

## 2024-01-21 RX ORDER — NITROGLYCERIN 0.4 MG/1
0.4 TABLET SUBLINGUAL EVERY 5 MIN PRN
Status: DISCONTINUED | OUTPATIENT
Start: 2024-01-21 | End: 2024-01-22 | Stop reason: HOSPADM

## 2024-01-21 RX ORDER — MIDAZOLAM HYDROCHLORIDE 1 MG/ML
INJECTION, SOLUTION INTRAMUSCULAR; INTRAVENOUS AS NEEDED
Status: DISCONTINUED | OUTPATIENT
Start: 2024-01-21 | End: 2024-01-21 | Stop reason: HOSPADM

## 2024-01-21 RX ORDER — ROPINIROLE 0.25 MG/1
0.25 TABLET, FILM COATED ORAL DAILY PRN
COMMUNITY
Start: 2024-01-10 | End: 2024-05-09

## 2024-01-21 RX ORDER — LISINOPRIL 10 MG/1
10 TABLET ORAL
COMMUNITY
Start: 2023-10-23 | End: 2024-01-22 | Stop reason: HOSPADM

## 2024-01-21 RX ORDER — NAPROXEN SODIUM 220 MG/1
81 TABLET, FILM COATED ORAL DAILY
Status: DISCONTINUED | OUTPATIENT
Start: 2024-01-21 | End: 2024-01-21 | Stop reason: SDUPTHER

## 2024-01-21 RX ORDER — MORPHINE SULFATE 2 MG/ML
2 INJECTION, SOLUTION INTRAMUSCULAR; INTRAVENOUS EVERY 6 HOURS PRN
Status: DISCONTINUED | OUTPATIENT
Start: 2024-01-21 | End: 2024-01-22 | Stop reason: HOSPADM

## 2024-01-21 RX ORDER — LISINOPRIL 2.5 MG/1
5 TABLET ORAL DAILY
Status: DISCONTINUED | OUTPATIENT
Start: 2024-01-21 | End: 2024-01-22 | Stop reason: HOSPADM

## 2024-01-21 RX ORDER — LIDOCAINE HYDROCHLORIDE AND EPINEPHRINE 20; 10 MG/ML; UG/ML
3 INJECTION, SOLUTION INFILTRATION; PERINEURAL ONCE AS NEEDED
Status: DISCONTINUED | OUTPATIENT
Start: 2024-01-21 | End: 2024-01-22 | Stop reason: HOSPADM

## 2024-01-21 RX ORDER — BISOPROLOL FUMARATE 5 MG/1
5 TABLET, FILM COATED ORAL DAILY
Status: DISCONTINUED | OUTPATIENT
Start: 2024-01-21 | End: 2024-01-22

## 2024-01-21 RX ORDER — LEVOTHYROXINE SODIUM 50 UG/1
50 TABLET ORAL
COMMUNITY

## 2024-01-21 RX ORDER — ATORVASTATIN CALCIUM 80 MG/1
80 TABLET, FILM COATED ORAL NIGHTLY
Status: DISCONTINUED | OUTPATIENT
Start: 2024-01-21 | End: 2024-01-22 | Stop reason: HOSPADM

## 2024-01-21 RX ORDER — DIPHENHYDRAMINE HCL 50 MG
50 CAPSULE ORAL ONCE
Status: COMPLETED | OUTPATIENT
Start: 2024-01-21 | End: 2024-01-21

## 2024-01-21 RX ORDER — IODIXANOL 270 MG/ML
INJECTION, SOLUTION INTRAVASCULAR AS NEEDED
Status: DISCONTINUED | OUTPATIENT
Start: 2024-01-21 | End: 2024-01-21 | Stop reason: HOSPADM

## 2024-01-21 RX ORDER — NITROGLYCERIN 5 MG/ML
INJECTION, SOLUTION INTRAVENOUS AS NEEDED
Status: DISCONTINUED | OUTPATIENT
Start: 2024-01-21 | End: 2024-01-21 | Stop reason: HOSPADM

## 2024-01-21 RX ORDER — LIDOCAINE HYDROCHLORIDE 10 MG/ML
INJECTION, SOLUTION EPIDURAL; INFILTRATION; INTRACAUDAL; PERINEURAL AS NEEDED
Status: DISCONTINUED | OUTPATIENT
Start: 2024-01-21 | End: 2024-01-21 | Stop reason: HOSPADM

## 2024-01-21 RX ORDER — MIRTAZAPINE 7.5 MG/1
1 TABLET, FILM COATED ORAL NIGHTLY
COMMUNITY
Start: 2024-01-09

## 2024-01-21 RX ORDER — HEPARIN SODIUM 1000 [USP'U]/ML
INJECTION, SOLUTION INTRAVENOUS; SUBCUTANEOUS AS NEEDED
Status: DISCONTINUED | OUTPATIENT
Start: 2024-01-21 | End: 2024-01-21 | Stop reason: HOSPADM

## 2024-01-21 RX ORDER — FENTANYL CITRATE 50 UG/ML
INJECTION, SOLUTION INTRAMUSCULAR; INTRAVENOUS AS NEEDED
Status: DISCONTINUED | OUTPATIENT
Start: 2024-01-21 | End: 2024-01-21 | Stop reason: HOSPADM

## 2024-01-21 RX ADMIN — DIPHENHYDRAMINE HYDROCHLORIDE 50 MG: 50 CAPSULE ORAL at 09:31

## 2024-01-21 RX ADMIN — PREDNISONE 50 MG: 20 TABLET ORAL at 09:31

## 2024-01-21 RX ADMIN — METOPROLOL TARTRATE 25 MG: 25 TABLET, FILM COATED ORAL at 08:09

## 2024-01-21 RX ADMIN — BISOPROLOL FUMARATE 5 MG: 5 TABLET, FILM COATED ORAL at 15:28

## 2024-01-21 RX ADMIN — PANTOPRAZOLE SODIUM 40 MG: 40 INJECTION, POWDER, FOR SOLUTION INTRAVENOUS at 08:09

## 2024-01-21 RX ADMIN — ATORVASTATIN CALCIUM 80 MG: 80 TABLET, FILM COATED ORAL at 20:22

## 2024-01-21 RX ADMIN — FAMOTIDINE 20 MG: 20 TABLET ORAL at 09:31

## 2024-01-21 RX ADMIN — ASPIRIN 81 MG: 81 TABLET, CHEWABLE ORAL at 08:09

## 2024-01-21 RX ADMIN — HEPARIN SODIUM 2000 UNITS: 5000 INJECTION, SOLUTION INTRAVENOUS; SUBCUTANEOUS at 06:35

## 2024-01-21 RX ADMIN — Medication 5 MG: at 20:22

## 2024-01-21 RX ADMIN — TICAGRELOR 90 MG: 90 TABLET ORAL at 20:22

## 2024-01-21 SDOH — SOCIAL STABILITY: SOCIAL INSECURITY: ARE YOU OR HAVE YOU BEEN THREATENED OR ABUSED PHYSICALLY, EMOTIONALLY, OR SEXUALLY BY ANYONE?: NO

## 2024-01-21 SDOH — SOCIAL STABILITY: SOCIAL INSECURITY: DO YOU FEEL ANYONE HAS EXPLOITED OR TAKEN ADVANTAGE OF YOU FINANCIALLY OR OF YOUR PERSONAL PROPERTY?: NO

## 2024-01-21 SDOH — SOCIAL STABILITY: SOCIAL INSECURITY: DOES ANYONE TRY TO KEEP YOU FROM HAVING/CONTACTING OTHER FRIENDS OR DOING THINGS OUTSIDE YOUR HOME?: NO

## 2024-01-21 SDOH — SOCIAL STABILITY: SOCIAL INSECURITY: DO YOU FEEL UNSAFE GOING BACK TO THE PLACE WHERE YOU ARE LIVING?: NO

## 2024-01-21 SDOH — SOCIAL STABILITY: SOCIAL INSECURITY: HAS ANYONE EVER THREATENED TO HURT YOUR FAMILY OR YOUR PETS?: NO

## 2024-01-21 SDOH — SOCIAL STABILITY: SOCIAL INSECURITY: ABUSE: ADULT

## 2024-01-21 SDOH — SOCIAL STABILITY: SOCIAL INSECURITY: ARE THERE ANY APPARENT SIGNS OF INJURIES/BEHAVIORS THAT COULD BE RELATED TO ABUSE/NEGLECT?: NO

## 2024-01-21 SDOH — SOCIAL STABILITY: SOCIAL INSECURITY: WERE YOU ABLE TO COMPLETE ALL THE BEHAVIORAL HEALTH SCREENINGS?: YES

## 2024-01-21 SDOH — SOCIAL STABILITY: SOCIAL INSECURITY: HAVE YOU HAD THOUGHTS OF HARMING ANYONE ELSE?: NO

## 2024-01-21 ASSESSMENT — COGNITIVE AND FUNCTIONAL STATUS - GENERAL
DAILY ACTIVITIY SCORE: 24
MOBILITY SCORE: 24
MOBILITY SCORE: 24
DAILY ACTIVITIY SCORE: 24
PATIENT BASELINE BEDBOUND: NO

## 2024-01-21 ASSESSMENT — PATIENT HEALTH QUESTIONNAIRE - PHQ9
2. FEELING DOWN, DEPRESSED OR HOPELESS: NOT AT ALL
1. LITTLE INTEREST OR PLEASURE IN DOING THINGS: NOT AT ALL
SUM OF ALL RESPONSES TO PHQ9 QUESTIONS 1 & 2: 0

## 2024-01-21 ASSESSMENT — PAIN SCALES - GENERAL
PAINLEVEL_OUTOF10: 0 - NO PAIN
PAINLEVEL_OUTOF10: 3
PAINLEVEL_OUTOF10: 1
PAINLEVEL_OUTOF10: 0 - NO PAIN

## 2024-01-21 ASSESSMENT — ACTIVITIES OF DAILY LIVING (ADL)
HEARING - LEFT EAR: FUNCTIONAL
HEARING - RIGHT EAR: FUNCTIONAL
WALKS IN HOME: INDEPENDENT
DRESSING YOURSELF: INDEPENDENT
TOILETING: INDEPENDENT
BATHING: INDEPENDENT
ADEQUATE_TO_COMPLETE_ADL: YES
FEEDING YOURSELF: INDEPENDENT
LACK_OF_TRANSPORTATION: NO
PATIENT'S MEMORY ADEQUATE TO SAFELY COMPLETE DAILY ACTIVITIES?: YES
GROOMING: INDEPENDENT
JUDGMENT_ADEQUATE_SAFELY_COMPLETE_DAILY_ACTIVITIES: YES

## 2024-01-21 ASSESSMENT — LIFESTYLE VARIABLES
AUDIT-C TOTAL SCORE: 0
SUBSTANCE_ABUSE_PAST_12_MONTHS: NO
HOW OFTEN DO YOU HAVE A DRINK CONTAINING ALCOHOL: NEVER
AUDIT-C TOTAL SCORE: 0
HOW OFTEN DO YOU HAVE 6 OR MORE DRINKS ON ONE OCCASION: NEVER
SKIP TO QUESTIONS 9-10: 1
HOW MANY STANDARD DRINKS CONTAINING ALCOHOL DO YOU HAVE ON A TYPICAL DAY: PATIENT DOES NOT DRINK

## 2024-01-21 ASSESSMENT — COLUMBIA-SUICIDE SEVERITY RATING SCALE - C-SSRS
1. IN THE PAST MONTH, HAVE YOU WISHED YOU WERE DEAD OR WISHED YOU COULD GO TO SLEEP AND NOT WAKE UP?: NO
6. HAVE YOU EVER DONE ANYTHING, STARTED TO DO ANYTHING, OR PREPARED TO DO ANYTHING TO END YOUR LIFE?: NO
2. HAVE YOU ACTUALLY HAD ANY THOUGHTS OF KILLING YOURSELF?: NO

## 2024-01-21 ASSESSMENT — PAIN DESCRIPTION - DESCRIPTORS
DESCRIPTORS: PRESSURE
DESCRIPTORS: PRESSURE

## 2024-01-21 NOTE — NURSING NOTE
Patient back from Cath lab. Right groin site dressing clean dry and intact. No hematoma noted. No swelling noted. Patient denies pain. Femoral and pedal pulses palpable. Extremity warm. Patient awake and oriented x3. No infusing running.

## 2024-01-21 NOTE — CONSULTS
Inpatient consult to Cardiology  Consult performed by: Valdo Alas MD  Consult ordered by: Jeremias Oconnor DO        History Of Present Illness:    Melva Young is a 73 y.o. female with a multiple comorbid condition history of protein S deficiency on Xarelto.  History of gallbladder surgery in past no prior cardiac history.  Episode of sharp shooting pain finally came to emergency room found to having subtle ST elevation in 1 and aVL without any V5 V5 6 changes.  Repeat EKG essentially ST segment resolved.  Patient admitted as acute nonresolution MI with guideline directed medical therapy with IV heparin protocol.  No active chest pain tightness.  Patient remained stable with IV heparin and nitroglycerin.  Patient does admit to having on and off short of breath dyspnea exertion.  Now here for further evaluation.  Last Recorded Vitals:  Vitals:    01/21/24 0900 01/21/24 1000 01/21/24 1031 01/21/24 1127   BP: 101/70 110/81 119/79 103/73   BP Location:       Patient Position:       Pulse: 78 75 75 78   Resp: (!) 27 21 18 18   Temp:       TempSrc:       SpO2: 96% 94% 99% 98%   Weight:       Height:           Past Medical History:  She has a past medical history of COPD (chronic obstructive pulmonary disease) (CMS/[facility]), Hyperlipidemia, and Hypertension.    Past Surgical History:  She has a past surgical history that includes Cholecystectomy.      Social History:  She reports that she has quit smoking. Her smoking use included cigarettes. She has never used smokeless tobacco. She reports current alcohol use of about 14.0 standard drinks of alcohol per week. She reports that she does not use drugs.    Family History:  No family history on file.     Allergies:  Patient has no known allergies.    Inpatient Medications:  Scheduled medications   Medication Dose Route Frequency    aspirin  81 mg oral Daily    atorvastatin  80 mg oral Nightly    heparin (porcine)  4,000 Units intravenous Once    metoprolol tartrate   25 mg oral q12h [facility]    oxygen   inhalation Continuous - 02/gases    pantoprazole  40 mg intravenous Daily    perflutren lipid microspheres  0.5-10 mL of dilution intravenous Once in imaging    perflutren protein A microsphere  0.5 mL intravenous Once in imaging    predniSONE  50 mg oral q6h    sulfur hexafluoride microsphr  2 mL intravenous Once in imaging    [START ON 1/22/2024] ticagrelor  90 mg oral BID     Outpatient Medications:  Current Outpatient Medications   Medication Instructions    levothyroxine (SYNTHROID, LEVOXYL) 50 mcg, oral, Daily before breakfast, Wednesday thru sunday    levothyroxine (SYNTHROID, LEVOXYL) 75 mcg, oral, 2 times weekly, Monday and tuesday    lisinopril 10 mg, oral, Daily RT    mirtazapine (Remeron) 7.5 mg tablet 1 tablet, oral, Nightly    rivaroxaban (XARELTO) 20 mg, oral, Daily with evening meal    rOPINIRole (REQUIP) 0.25 mg, oral, Daily PRN       Physical Exam:  HEENT: Normocephalic/atraumatic pupils equal react light  Neck exam mild JVD, no bruit  Lung exam clear to auscultation, few crackles at the bases  Cardiac exam is regular rhythm S1-S2, soft slight murmur heard.  No S3 heard.  Abdomen soft nontender, nondistended  Extremities no clubbing, cyanosis but trace edema  Neuro exam grossly intact.  Last Labs:  CBC - 1/21/2024:  4:29 AM  5.0 12.8 145    38.4      CMP - 1/21/2024:  4:30 AM  9.1 7.8 24 --- 0.3   _ 4.6 20 103      PTT - 1/21/2024:  4:51 AM  1.3   13.7 41.3     LDL Calculated   Date/Time Value Ref Range Status   01/21/2024 04:30  (H) 65 - 130 mg/dL Final          Cardiac catheterization - coronary  Recommendations:  1.  Continue dual doublet therapy aspirin, ticagrelor for 1 month then   continue ticagrelor and Xarelto due to underlying stents as well as a   protein S deficiency.  Discussed with the patient about high risk for   bleeding.    2.  Continue guideline directed medical therapy for acute non-ST elation   MI then quitting on nitroglycerin, aspirin,  statin, ACE inhibitor,   beta-blocker as well as ticagrelor.  Continued ticagrelor and Xarelto for   1 year post PCI without continue Xarelto only with aspirin for rest of   life.      Principal Problem:    NSTEMI (non-ST elevated myocardial infarction) (CMS/[facility])  Active Problems:    Hypertension    Hyperlipidemia    Assessment/Plan   Patient has above multiple comorbid condition history of hypertension hyperlipidemia history of protein S deficiency.  Now with acute non-ST elevation MI.  Continue guideline directed medical therapy including nitroglycerin, aspirin, statin, ACE inhibitor, beta-blocker as well as Brilinta.  Modify risk factor.  Critical care time is spent at bedside includes review of diagnostic tests, labs, and radiographs, serial assessments and management of hemodynamics, EKGs, old echoes, cardiac work-up and coordination of care.  Assessment, impression and plans are reflected in the note above as well as the orders.    Code Status:  Prior  I spent 60 minutes in the professional and overall care of this patient.  Valdo Alas MD

## 2024-01-21 NOTE — CARE PLAN
Problem: Pain  Goal: My pain/discomfort is manageable  Outcome: Progressing     Problem: Safety  Goal: Patient will be injury free during hospitalization  Outcome: Progressing  Goal: I will remain free of falls  Outcome: Progressing     Problem: Daily Care  Goal: Daily care needs are met  Outcome: Progressing     Problem: Psychosocial Needs  Goal: Demonstrates ability to cope with hospitalization/illness  Outcome: Progressing  Goal: Collaborate with me, my family, and caregiver to identify my specific goals  Outcome: Progressing  Flowsheets (Taken 1/21/2024 5535)  Cultural Requests During Hospitalization: declines  Spiritual Requests During Hospitalization: declines     Problem: Discharge Barriers  Goal: My discharge needs are met  Outcome: Progressing

## 2024-01-21 NOTE — POST-PROCEDURE NOTE
Physician Transition of Care Summary  Invasive Cardiovascular Lab    Procedure Date: 1/21/2024  Attending:    * Valdo Alas - Primary  Resident/Fellow/Other Assistant: Surgeon(s) and Role:    Indications:   Pre-op Diagnosis     * NSTEMI (non-ST elevated myocardial infarction) (CMS/HCC) [I21.4]    Post-procedure diagnosis:   Post-op Diagnosis     * NSTEMI (non-ST elevated myocardial infarction) (CMS/HCC) [I21.4]    Procedure(s):     * Left Heart Cath    * PCI      Procedure Findings:   Patient with diagnostic agitation due to acute non-ST elation MI.  Stable cardiac wise no active chest pain tightness.  Left main appeared to be fairly okay, LAD has slow flow, circumflex has a mid area focal critical 90% lesion seen.  Her coronary artery is codominant vessel has no significant lesion.  Jek fraction about 50 to 60% with a mild inferior wall hypokinesis seen.  Description of the Procedure:   Patient is status post diagnostic catheterization with a PCI of mid LCx using a drug-eluting stents.  2.5 x 22 mm [place] stents.    Complications:   None    Stents/Implants:   Cardiovascular Implants       Stent    Stent, [place], 2.50 X 22rx - [id number] - Implanted        Inventory item: STENT, JULIUS FRONTIER MANDEEP, 2.50 X 22RX Model/Cat number: [id number]    : [employer] Lot number: [id number]    Device identifier: [id number]        As of 1/21/2024       Status: Implanted                              Anticoagulation/Antiplatelet Plan:   Continue Brilinta, aspirin 81 as well as Xarelto due to protein S deficiency.    Estimated Blood Loss:   10 mL    Anesthesia: Moderate Sedation Anesthesia Staff: No anesthesia staff entered.    Any Specimen(s) Removed:   No specimens collected during this procedure.    Disposition:   Back to ICU bed 17      Electronically signed by: Valdo Alas MD, 1/21/2024 11:30 AM

## 2024-01-21 NOTE — PROGRESS NOTES
"Melva Young is a 73 y.o. female on day 1 of admission presenting with NSTEMI (non-ST elevated myocardial infarction) (CMS/[facility]).      Subjective   Underwent cardiac cath this AM, had stent placed. Evaluated after returning from the cath lab. States she feels \"great\". Denies any chest pain or shortness of breath.        Objective     Last Recorded Vitals  /75   Pulse 75   Temp 36.9 °C (98.4 °F) (Temporal)   Resp 21   Wt 86 kg (189 lb 9.5 oz)   SpO2 97%   Intake/Output last 3 Shifts:    Intake/Output Summary (Last 24 hours) at 1/21/2024 1253  Last data filed at 1/21/2024 1127  Gross per 24 hour   Intake 164.95 ml   Output 460 ml   Net -295.05 ml       Admission Weight  Weight: 86 kg (189 lb 9.5 oz) (01/21/24 0820)    Daily Weight  01/21/24 : 86 kg (189 lb 9.5 oz)    Image Results  Cardiac catheterization - coronary  Recommendations:  1.  Continue dual doublet therapy aspirin, ticagrelor for 1 month then   continue ticagrelor and Xarelto due to underlying stents as well as a   protein S deficiency.  Discussed with the patient about high risk for   bleeding.    2.  Continue guideline directed medical therapy for acute non-ST elation   MI then quitting on nitroglycerin, aspirin, statin, ACE inhibitor,   beta-blocker as well as ticagrelor.  Continued ticagrelor and Xarelto for   1 year post PCI without continue Xarelto only with aspirin for rest of   life.      Physical Exam  Constitutional:       General: She is not in acute distress.     Appearance: She is not toxic-appearing.   HENT:      Head: Normocephalic and atraumatic.      Mouth/Throat:      Mouth: Mucous membranes are moist.      Pharynx: Oropharynx is clear.   Eyes:      General: No scleral icterus.  Cardiovascular:      Rate and Rhythm: Normal rate and regular rhythm.   Pulmonary:      Effort: No respiratory distress.      Breath sounds: No wheezing.   Abdominal:      General: There is no distension.      Palpations: Abdomen is soft. "   Musculoskeletal:      Right lower leg: No edema.      Left lower leg: No edema.   Neurological:      Mental Status: She is alert and oriented to person, place, and time.   Psychiatric:         Mood and Affect: Mood normal.         Behavior: Behavior normal.         Relevant Results               Assessment/Plan   This patient currently has cardiac telemetry ordered; if you would like to modify or discontinue the telemetry order, click here to go to the orders activity to modify/discontinue the order.      Principal Problem:    NSTEMI (non-ST elevated myocardial infarction) (CMS/[facility])  Active Problems:    Hypertension    Hyperlipidemia    NSTEMI  Troponin 312 -> 929 ->974  S/p heparin and nitroglycerin drip - now discontinued  Cardiology following  S/p cardiac cath 1/21/24 with PCI to the left circumflex  Continue aspirin and brillinta  Echo ordered  Started on metoprolol 25mg daily  Cardiac diet    Protein S deficiency  Possible acute pulmonary embolism  CTA chest showing possible right lower lobe segmental/subsegmental pulmonary embolism  Case discussed with Dr. Alas, who recommended that patient remain on Xarelto    Dispo: possible discharge home Monday or tuesday              Jaquelin Zacarias MD

## 2024-01-21 NOTE — H&P
History Of Present Illness  Melva Young is a 73 y.o. female presenting with chest pain.  She has a history of protein S deficiency on Xarelto and very compliant with this therapy; she took her dose today.  Around 11 AM she developed sharp chest pain which has been progressively getting worse.  No jaw pain.  No left arm pain.  No diaphoresis.  No shortness of breath.  No history of myocardial infarction.  In the emergency department at [facility], her initial troponin was 300 coming up to 900; STEMI alert was called, and was reviewed by cardiology Dr. Alas and this was canceled.  Patient was transferred to ICU at [facility] due to [facility] not having any Cath Lab capabilities.  Patient is currently still in pain after receiving fentanyl and 2 by mouth doses of nitroglycerin..  CT angio was done which did not demonstrate pulmonary embolus though somewhat limited study.     Past Medical History  She has a past medical history of COPD (chronic obstructive pulmonary disease) (CMS/[facility]) and Hypertension.    Surgical History  She has a past surgical history that includes Cholecystectomy.     Social History  She reports that she has quit smoking. Her smoking use included cigarettes. She has never used smokeless tobacco. She reports current alcohol use of about 14.0 standard drinks of alcohol per week. She reports that she does not use drugs.    Family History  No family history on file.     Allergies  Patient has no known allergies.    Review of Systems   All other systems reviewed and are negative.       Physical Exam  Constitutional:       Appearance: Normal appearance.   HENT:      Head: Normocephalic.      Right Ear: External ear normal.      Left Ear: External ear normal.   Eyes:      Conjunctiva/sclera: Conjunctivae normal.   Cardiovascular:      Rate and Rhythm: Normal rate.   Pulmonary:      Effort: Pulmonary effort is normal.      Breath sounds: Normal breath sounds.   Skin:     General: Skin is warm.    Neurological:      General: No focal deficit present.      Mental Status: She is alert.   Psychiatric:         Mood and Affect: Mood normal.          Last Recorded Vitals  /86   Pulse 96   Resp (!) 42   SpO2 96%     Relevant Results        Results for orders placed or performed during the hospital encounter of 01/20/24 (from the past 24 hour(s))   CBC with Differential   Result Value Ref Range    WBC 4.4 4.4 - 11.3 x10*3/uL    nRBC 0.0 0.0 - 0.0 /100 WBCs    RBC 4.59 4.00 - 5.20 x10*6/uL    Hemoglobin 14.9 12.0 - 16.0 g/dL    Hematocrit 44.5 36.0 - 46.0 %    MCV 97 80 - 100 fL    MCH 32.5 26.0 - 34.0 pg    MCHC 33.5 32.0 - 36.0 g/dL    RDW 12.3 11.5 - 14.5 %    Platelets 147 (L) 150 - 450 x10*3/uL    Neutrophils % 57.3 40.0 - 80.0 %    Immature Granulocytes %, Automated 0.2 0.0 - 0.9 %    Lymphocytes % 33.1 13.0 - 44.0 %    Monocytes % 7.3 2.0 - 10.0 %    Eosinophils % 1.4 0.0 - 6.0 %    Basophils % 0.7 0.0 - 2.0 %    Neutrophils Absolute 2.53 1.60 - 5.50 x10*3/uL    Immature Granulocytes Absolute, Automated 0.01 0.00 - 0.50 x10*3/uL    Lymphocytes Absolute 1.46 0.80 - 3.00 x10*3/uL    Monocytes Absolute 0.32 0.05 - 0.80 x10*3/uL    Eosinophils Absolute 0.06 0.00 - 0.40 x10*3/uL    Basophils Absolute 0.03 0.00 - 0.10 x10*3/uL   Comprehensive Metabolic Panel   Result Value Ref Range    Glucose 137 (H) 65 - 99 mg/dL    Sodium 135 133 - 145 mmol/L    Potassium 4.2 3.4 - 5.1 mmol/L    Chloride 98 97 - 107 mmol/L    Bicarbonate 26 24 - 31 mmol/L    Urea Nitrogen 16 8 - 25 mg/dL    Creatinine 0.80 0.40 - 1.60 mg/dL    eGFR 78 >60 mL/min/1.73m*2    Calcium 10.0 8.5 - 10.4 mg/dL    Albumin 4.6 3.5 - 5.0 g/dL    Alkaline Phosphatase 103 35 - 125 U/L    Total Protein 7.8 5.9 - 7.9 g/dL    AST 24 5 - 40 U/L    Bilirubin, Total 0.3 0.1 - 1.2 mg/dL    ALT 20 5 - 40 U/L    Anion Gap 11 <=19 mmol/L   Serial Troponin, Initial ([facility])   Result Value Ref Range    Troponin T, High Sensitivity 312 (HH) <=14 ng/L   aPTT    Result Value Ref Range    aPTT 33.3 (H) 22.0 - 32.5 seconds   Serial Troponin, 2 Hour ([facility])   Result Value Ref Range    Troponin T, High Sensitivity 929 (HH) <=14 ng/L         Assessment/Plan   Principal Problem:    NSTEMI (non-ST elevated myocardial infarction) (CMS/[facility])      NSTEMI  -Troponin escalating in ACS pattern.  EKG showing some ST elevations only in V2 to my eye with ST depressions in lead III  -Will monitor in the ICU, but feel this patient is more appropriate for stepdown and will keep on the hospital service  -Lipid profile  -Continue aspirin.  Continue heparin drip low-dose protocol  -Start beta-blocker metoprolol 25 mg.  With continued chest pain, will start nitroglycerin and be very aware of transfer hypotension  -Echocardiogram  -N.p.o. at midnight for possible cath  -Cardiology consult.    Protein S deficiency  -Patient is very compliant with her Xarelto therapy  -Will continue heparin as above, and transition to Xarelto upon discharge.           Jeremias Oconnor, DO

## 2024-01-21 NOTE — NURSING NOTE
BSSR. Patient resting in bed with eyes closed. Chest rise and fall visualized. No distress noted. nitroglycerin and heparin infusing see MAR. Call light within reach.

## 2024-01-22 ENCOUNTER — APPOINTMENT (OUTPATIENT)
Dept: CARDIOLOGY | Facility: HOSPITAL | Age: 74
DRG: 321 | End: 2024-01-22
Payer: MEDICARE

## 2024-01-22 ENCOUNTER — PHARMACY VISIT (OUTPATIENT)
Dept: PHARMACY | Facility: CLINIC | Age: 74
End: 2024-01-22
Payer: COMMERCIAL

## 2024-01-22 VITALS
BODY MASS INDEX: 32.59 KG/M2 | OXYGEN SATURATION: 96 % | RESPIRATION RATE: 20 BRPM | HEART RATE: 77 BPM | SYSTOLIC BLOOD PRESSURE: 122 MMHG | DIASTOLIC BLOOD PRESSURE: 70 MMHG | WEIGHT: 190.92 LBS | TEMPERATURE: 97.5 F | HEIGHT: 64 IN

## 2024-01-22 PROBLEM — I26.99 PULMONARY EMBOLISM (MULTI): Status: ACTIVE | Noted: 2024-01-22

## 2024-01-22 LAB
ALBUMIN SERPL-MCNC: 4 G/DL (ref 3.5–5)
ANION GAP SERPL CALC-SCNC: 13 MMOL/L
AORTIC VALVE PEAK VELOCITY: 1.36 M/S
AV PEAK GRADIENT: 7.4 MMHG
AVA (PEAK VEL): 1.4 CM2
BASOPHILS # BLD AUTO: 0.01 X10*3/UL (ref 0–0.1)
BASOPHILS NFR BLD AUTO: 0.1 %
BUN SERPL-MCNC: 22 MG/DL (ref 8–25)
CALCIUM SERPL-MCNC: 9.3 MG/DL (ref 8.5–10.4)
CHLORIDE SERPL-SCNC: 101 MMOL/L (ref 97–107)
CO2 SERPL-SCNC: 21 MMOL/L (ref 24–31)
CREAT SERPL-MCNC: 0.9 MG/DL (ref 0.4–1.6)
EGFRCR SERPLBLD CKD-EPI 2021: 68 ML/MIN/1.73M*2
EJECTION FRACTION APICAL 4 CHAMBER: 49.6
EJECTION FRACTION: 46 %
EOSINOPHIL # BLD AUTO: 0.02 X10*3/UL (ref 0–0.4)
EOSINOPHIL NFR BLD AUTO: 0.3 %
ERYTHROCYTE [DISTWIDTH] IN BLOOD BY AUTOMATED COUNT: 12.7 % (ref 11.5–14.5)
GLUCOSE SERPL-MCNC: 120 MG/DL (ref 65–99)
HCT VFR BLD AUTO: 40.7 % (ref 36–46)
HGB BLD-MCNC: 13 G/DL (ref 12–16)
IMM GRANULOCYTES # BLD AUTO: 0.01 X10*3/UL (ref 0–0.5)
IMM GRANULOCYTES NFR BLD AUTO: 0.1 % (ref 0–0.9)
LEFT ATRIUM VOLUME AREA LENGTH INDEX BSA: 15.8 ML/M2
LEFT VENTRICLE INTERNAL DIMENSION DIASTOLE: 5.13 CM (ref 3.5–6)
LEFT VENTRICULAR OUTFLOW TRACT DIAMETER: 1.9 CM
LYMPHOCYTES # BLD AUTO: 1.21 X10*3/UL (ref 0.8–3)
LYMPHOCYTES NFR BLD AUTO: 18 %
MCH RBC QN AUTO: 31.8 PG (ref 26–34)
MCHC RBC AUTO-ENTMCNC: 31.9 G/DL (ref 32–36)
MCV RBC AUTO: 100 FL (ref 80–100)
MITRAL VALVE E/A RATIO: 0.84
MITRAL VALVE E/E' RATIO: 12.86
MONOCYTES # BLD AUTO: 0.49 X10*3/UL (ref 0.05–0.8)
MONOCYTES NFR BLD AUTO: 7.3 %
NEUTROPHILS # BLD AUTO: 4.98 X10*3/UL (ref 1.6–5.5)
NEUTROPHILS NFR BLD AUTO: 74.2 %
NRBC BLD-RTO: 0 /100 WBCS (ref 0–0)
PHOSPHATE SERPL-MCNC: 3.6 MG/DL (ref 2.5–4.5)
PLATELET # BLD AUTO: 143 X10*3/UL (ref 150–450)
POTASSIUM SERPL-SCNC: 4.2 MMOL/L (ref 3.4–5.1)
RBC # BLD AUTO: 4.09 X10*6/UL (ref 4–5.2)
RIGHT VENTRICLE PEAK SYSTOLIC PRESSURE: 20 MMHG
SODIUM SERPL-SCNC: 135 MMOL/L (ref 133–145)
WBC # BLD AUTO: 6.7 X10*3/UL (ref 4.4–11.3)

## 2024-01-22 PROCEDURE — 2500000004 HC RX 250 GENERAL PHARMACY W/ HCPCS (ALT 636 FOR OP/ED): Performed by: INTERNAL MEDICINE

## 2024-01-22 PROCEDURE — 2500000001 HC RX 250 WO HCPCS SELF ADMINISTERED DRUGS (ALT 637 FOR MEDICARE OP): Performed by: HOSPITALIST

## 2024-01-22 PROCEDURE — 2500000001 HC RX 250 WO HCPCS SELF ADMINISTERED DRUGS (ALT 637 FOR MEDICARE OP): Performed by: INTERNAL MEDICINE

## 2024-01-22 PROCEDURE — 93005 ELECTROCARDIOGRAM TRACING: CPT

## 2024-01-22 PROCEDURE — 2500000004 HC RX 250 GENERAL PHARMACY W/ HCPCS (ALT 636 FOR OP/ED): Mod: MUE | Performed by: INTERNAL MEDICINE

## 2024-01-22 PROCEDURE — RXMED WILLOW AMBULATORY MEDICATION CHARGE

## 2024-01-22 PROCEDURE — 99233 SBSQ HOSP IP/OBS HIGH 50: CPT | Performed by: INTERNAL MEDICINE

## 2024-01-22 PROCEDURE — C9113 INJ PANTOPRAZOLE SODIUM, VIA: HCPCS | Performed by: INTERNAL MEDICINE

## 2024-01-22 PROCEDURE — 85025 COMPLETE CBC W/AUTO DIFF WBC: CPT | Performed by: INTERNAL MEDICINE

## 2024-01-22 PROCEDURE — 93306 TTE W/DOPPLER COMPLETE: CPT

## 2024-01-22 PROCEDURE — 2500000001 HC RX 250 WO HCPCS SELF ADMINISTERED DRUGS (ALT 637 FOR MEDICARE OP): Performed by: STUDENT IN AN ORGANIZED HEALTH CARE EDUCATION/TRAINING PROGRAM

## 2024-01-22 PROCEDURE — 36415 COLL VENOUS BLD VENIPUNCTURE: CPT | Performed by: INTERNAL MEDICINE

## 2024-01-22 PROCEDURE — 93306 TTE W/DOPPLER COMPLETE: CPT | Performed by: INTERNAL MEDICINE

## 2024-01-22 PROCEDURE — 80069 RENAL FUNCTION PANEL: CPT | Performed by: INTERNAL MEDICINE

## 2024-01-22 RX ORDER — LISINOPRIL 2.5 MG/1
2.5 TABLET ORAL DAILY
Qty: 30 TABLET | Refills: 0 | Status: SHIPPED | OUTPATIENT
Start: 2024-01-23 | End: 2024-02-13 | Stop reason: SDUPTHER

## 2024-01-22 RX ORDER — ATORVASTATIN CALCIUM 80 MG/1
80 TABLET, FILM COATED ORAL NIGHTLY
Qty: 30 TABLET | Refills: 0 | Status: SHIPPED | OUTPATIENT
Start: 2024-01-22 | End: 2024-02-13 | Stop reason: SDUPTHER

## 2024-01-22 RX ORDER — BISOPROLOL FUMARATE 5 MG/1
2.5 TABLET, FILM COATED ORAL DAILY
Status: DISCONTINUED | OUTPATIENT
Start: 2024-01-23 | End: 2024-01-22 | Stop reason: HOSPADM

## 2024-01-22 RX ORDER — MIRTAZAPINE 7.5 MG/1
7.5 TABLET, FILM COATED ORAL NIGHTLY
Status: DISCONTINUED | OUTPATIENT
Start: 2024-01-22 | End: 2024-01-22 | Stop reason: HOSPADM

## 2024-01-22 RX ORDER — BISOPROLOL FUMARATE 5 MG/1
2.5 TABLET, FILM COATED ORAL DAILY
Qty: 15 TABLET | Refills: 0 | Status: SHIPPED | OUTPATIENT
Start: 2024-01-23 | End: 2024-02-13 | Stop reason: SDUPTHER

## 2024-01-22 RX ORDER — LISINOPRIL 2.5 MG/1
2.5 TABLET ORAL DAILY
Status: DISCONTINUED | OUTPATIENT
Start: 2024-01-22 | End: 2024-01-22 | Stop reason: HOSPADM

## 2024-01-22 RX ORDER — LEVOTHYROXINE SODIUM 75 UG/1
75 TABLET ORAL 2 TIMES WEEKLY
Status: DISCONTINUED | OUTPATIENT
Start: 2024-01-22 | End: 2024-01-22 | Stop reason: HOSPADM

## 2024-01-22 RX ORDER — NAPROXEN SODIUM 220 MG/1
81 TABLET, FILM COATED ORAL DAILY
Qty: 30 TABLET | Refills: 0 | Status: SHIPPED | OUTPATIENT
Start: 2024-01-23 | End: 2024-02-27 | Stop reason: SINTOL

## 2024-01-22 RX ADMIN — TICAGRELOR 90 MG: 90 TABLET ORAL at 09:23

## 2024-01-22 RX ADMIN — BISOPROLOL FUMARATE 5 MG: 5 TABLET, FILM COATED ORAL at 09:24

## 2024-01-22 RX ADMIN — LISINOPRIL 2.5 MG: 2.5 TABLET ORAL at 09:45

## 2024-01-22 RX ADMIN — PANTOPRAZOLE SODIUM 40 MG: 40 INJECTION, POWDER, FOR SOLUTION INTRAVENOUS at 09:24

## 2024-01-22 RX ADMIN — ASPIRIN 81 MG CHEWABLE TABLET 81 MG: 81 TABLET CHEWABLE at 09:23

## 2024-01-22 RX ADMIN — LEVOTHYROXINE SODIUM 75 MCG: 0.07 TABLET ORAL at 11:20

## 2024-01-22 ASSESSMENT — COGNITIVE AND FUNCTIONAL STATUS - GENERAL
MOBILITY SCORE: 24
DAILY ACTIVITIY SCORE: 24

## 2024-01-22 ASSESSMENT — PAIN - FUNCTIONAL ASSESSMENT
PAIN_FUNCTIONAL_ASSESSMENT: 0-10

## 2024-01-22 ASSESSMENT — PAIN SCALES - GENERAL
PAINLEVEL_OUTOF10: 0 - NO PAIN

## 2024-01-22 NOTE — CARE PLAN
Problem: Pain  Goal: My pain/discomfort is manageable  1/22/2024 1217 by Shaka Bosch RN  Outcome: Met  1/22/2024 1051 by Shaka Bosch RN  Outcome: Progressing  1/22/2024 0753 by Shaka Bosch RN  Outcome: Progressing     Problem: Safety  Goal: Patient will be injury free during hospitalization  1/22/2024 1217 by Shaka Bosch RN  Outcome: Met  1/22/2024 1051 by Shaka Bosch RN  Outcome: Progressing  1/22/2024 0753 by Shaka Bosch RN  Outcome: Progressing  Goal: I will remain free of falls  1/22/2024 1217 by Shaka Bosch RN  Outcome: Met  1/22/2024 1051 by Shaka Bosch RN  Outcome: Progressing  1/22/2024 0753 by Shaka Bosch RN  Outcome: Progressing     Problem: Daily Care  Goal: Daily care needs are met  1/22/2024 1217 by Shaka Bosch RN  Outcome: Met  1/22/2024 1051 by Shaka Bosch RN  Outcome: Progressing  1/22/2024 0753 by Shaka Bosch RN  Outcome: Progressing     Problem: Psychosocial Needs  Goal: Demonstrates ability to cope with hospitalization/illness  1/22/2024 1217 by Shaka Bosch RN  Outcome: Met  1/22/2024 1051 by Shaka Bosch RN  Outcome: Progressing  1/22/2024 0753 by Shaka Bosch RN  Outcome: Progressing  Goal: Collaborate with me, my family, and caregiver to identify my specific goals  1/22/2024 1217 by Shaka Bosch RN  Outcome: Met  1/22/2024 1051 by Shaka Bosch RN  Outcome: Progressing  1/22/2024 0753 by Shaka Bosch RN  Outcome: Progressing     Problem: Discharge Barriers  Goal: My discharge needs are met  1/22/2024 1217 by Shaka Bosch RN  Outcome: Met  1/22/2024 1051 by Shaka Bosch RN  Outcome: Progressing  1/22/2024 0753 by Shaka Bosch RN  Outcome: Progressing     Problem: Discharge Planning  Goal: Discharge to home or other facility with appropriate resources  1/22/2024 1217 by Shaka Bosch RN  Outcome: Met  1/22/2024 1051 by Shaka Bosch RN  Reactivated     Problem: Chronic  Conditions and Co-morbidities  Goal: Patient's chronic conditions and co-morbidity symptoms are monitored and maintained or improved  1/22/2024 1217 by Shaka Bosch RN  Outcome: Met  1/22/2024 1051 by Shaka Bosch RN  Reactivated

## 2024-01-22 NOTE — NURSING NOTE
Discharge instructions on activities, medications and follow up provided. AVS reviewed and handed over. Patient acknowledged health education provided.     Not in any form of distress, no complaints at this time. Femoral site is asymptomatic. Awaiting transport.

## 2024-01-22 NOTE — DISCHARGE SUMMARY
Discharge Diagnosis  NSTEMI (non-ST elevated myocardial infarction) (CMS/[facility])    Issues Requiring Follow-Up  Follow up with cardiology    Discharge Meds     Your medication list        START taking these medications        Instructions Last Dose Given Next Dose Due   aspirin 81 mg chewable tablet  Start taking on: January 23, 2024      Chew and swallow 1 tablet (81 mg) by mouth once daily. Do not start before January 23, 2024.       atorvastatin 80 mg tablet  Commonly known as: Lipitor      Take 1 tablet (80 mg) by mouth once daily at bedtime.       bisoprolol 5 mg tablet  Commonly known as: Zebeta  Start taking on: January 23, 2024      Take 1/2 tablet (2.5 mg) by mouth once daily. Do not start before January 23, 2024.       Brilinta 90 mg tablet  Generic drug: ticagrelor      Take 1 tablet (90 mg) by mouth 2 times a day.              CHANGE how you take these medications        Instructions Last Dose Given Next Dose Due   lisinopril 2.5 mg tablet  Start taking on: January 23, 2024  What changed:   medication strength  how much to take  when to take this      Take 1 tablet (2.5 mg) by mouth once daily. Do not start before January 23, 2024.              CONTINUE taking these medications        Instructions Last Dose Given Next Dose Due   levothyroxine 50 mcg tablet  Commonly known as: Synthroid, Levoxyl           levothyroxine 75 mcg tablet  Commonly known as: Synthroid, Levoxyl           mirtazapine 7.5 mg tablet  Commonly known as: Remeron           rivaroxaban 20 mg tablet  Commonly known as: Xarelto           rOPINIRole 0.25 mg tablet  Commonly known as: Requip                     Where to Get Your Medications        These medications [facility]  [id number] [facility] [id number]      Hours: 9 AM to 6 PM Mon-Fri, 9 AM to 1 PM Sat Phone: [phone]   aspirin 81 mg chewable tablet  atorvastatin 80 mg tablet  bisoprolol 5 mg tablet  Brilinta 90 mg tablet  lisinopril 2.5 mg  tablet         Test Results Pending At Discharge  Pending Labs       No current pending labs.            Hospital Course   This is a 23-year-old woman with history of protein S deficiency who presented to the emergency with chest pain.  Initially called as a STEMI but this was canceled.  She did have NSTEMI with elevated troponins.  CT chest showed a possible right subsegmental PE.  She is on chronic coagulation with Xarelto.  She was started on a heparin drip and transferred to [facility].  She had a left heart catheterization with PCI to the left circumflex.  She is currently on aspirin, Brilinta, Xarelto.  She needs to follow-up with her primary care doctor and cardiology.  She needs to take medications as prescribed.  She was started on atorvastatin, bisoprolol, and her dose of lisinopril was lowered.    Pertinent Physical Exam At Time of Discharge  Physical Exam  General: alert, no diaphoresis   Lungs: CTA BL   Heart: RRR, no murmurs, no LE edema BL   GI: abdomen soft, nontender, nondistended, BS present   MSK: no joint effusion or deformity   Skin: no rashes, erythema, or ecchymosis   Neuro: grossly normal cognition, motor strength, sensation    Outpatient Follow-Up  No future appointments.    Discharge time spent: 35 min      Stefany Almendarez DO

## 2024-01-22 NOTE — NURSING NOTE
Patient placed on tele pack. Ambulated in the hallway about 50ft. No chest discomfort or pain but was slightly short of breath, no changes in the monitor and no desaturation. Not in distress. Instructed patient to pace her activities, she acknowledged. Right groin site, asymptomatic.

## 2024-01-22 NOTE — PROGRESS NOTES
Subjective Data:  Patient with a history of CAD, protein S deficiency now with acute nonresolution MI s/p PCI of mid LCx.  Ejection fraction about 50% to low normal.  Stable cardiac wise no active chest pain tightness patient does need to be discharged home on a triple blood thinners including aspirin 81, Brilinta, Xarelto 20 mg due to protein S deficiency.  Probably after a month dropped aspirin continue only on Xarelto as well as Brilinta.  Discussed with the patient about high risk for bleeding on multiple blood thinner medication.  Questionable PE subsegmental by CT scan could be from slow flow into the arteries which I observed during diagnostic catheterization.  Overnight Events:    None  Objective   Last Recorded Vitals  /76 (BP Location: Right arm, Patient Position: Lying)   Pulse 73   Temp 36.7 °C (98.1 °F) (Temporal)   Resp 22   Wt 86.6 kg (190 lb 14.7 oz)   SpO2 97%     Intake/Output Summary (Last 24 hours) at 1/22/2024 1136  Last data filed at 1/22/2024 0945  Gross per 24 hour   Intake 873.5 ml   Output --   Net 873.5 ml     Physical Exam:  HEENT: Normocephalic/atraumatic pupils equal react light  Neck exam mild JVD, no bruit  Lung exam clear to auscultation, few crackles at the bases  Cardiac exam is regular rhythm S1-S2, soft slight murmur heard.  No S3 heard.  Abdomen soft nontender, nondistended  Extremities no clubbing, cyanosis but trace edema  Neuro exam grossly intact.  Image Results  Transthoracic Echo (TTE) Complete             [place] [id number]             Phone [phone]    TRANSTHORACIC ECHOCARDIOGRAM REPORT       Patient Name:      MARCELINO PAULOSBALDO       Reading Physician:   [id number] Valdo Pascual MD  Study Date:        1/22/2024           Ordering Provider:   [id number] VALDO PASCUAL  MRN/PID:           [id number]            Fellow:  Accession#:        [id number]         Nurse:  Date of Birth/Age: 1950 / 73      Sonographer:         Amara Tim RDCS                     years  Gender:            F                   Additional Staff:  Height:            162.00 cm           Admit Date:  Weight:            86.00 kg            Admission Status:    Inpatient - Routine  BSA:               1.91 m2             Department Location: [facility]  Blood Pressure: 92 /59 mmHg    Study Type:    TRANSTHORACIC ECHO (TTE) COMPLETE  Diagnosis/ICD: Non ST elevation (NSTEMI) myocardial infarction-I21.4  Indication:    nstemi  CPT Codes:     Echo Complete w Full Doppler-[id number]    Patient History:  Smoker:            Former.  BMI:               Obese >30  Pertinent History: CAD and HTN. nstemi.    Study Detail: The following Echo studies were performed: 2D, M-Mode, Doppler and                color flow. Technically challenging study due to prominent lung                artifact and body habitus.       PHYSICIAN INTERPRETATION:  Left Ventricle: Left ventricular systolic function is low normal, with an estimated ejection fraction of 45-50%. There are multiple wall motion abnormalities. The left ventricular cavity size is upper limits of normal. Spectral Doppler shows an impaired relaxation pattern of left ventricular diastolic filling.  Left Atrium: The left atrium is normal in size.  Right Ventricle: The right ventricle is normal in size. There is normal right ventricular global systolic function.  Right Atrium: The right atrium is normal in size.  Aortic Valve: The aortic valve is probably trileaflet. There is trivial aortic valve regurgitation. The peak instantaneous gradient of the aortic valve is 7.4 mmHg.  Mitral Valve: The mitral valve was not well visualized. There is trace mitral valve regurgitation.  Tricuspid Valve: The tricuspid valve is structurally normal. There is trace to mild tricuspid regurgitation.  Pulmonic Valve: The pulmonic valve is not well visualized. There is no indication  of pulmonic valve regurgitation.  Pericardium: There is no pericardial effusion noted.  Aorta: The aortic root was not well visualized.       CONCLUSIONS:   1. Left ventricular systolic function is low normal with a 45-50% estimated ejection fraction.   2. Spectral Doppler shows an impaired relaxation pattern of left ventricular diastolic filling.   3. There are multiple wall motion abnormalities.    QUANTITATIVE DATA SUMMARY:  2D MEASUREMENTS:                           Normal Ranges:  LAs:           3.00 cm   (2.7-4.0cm)  IVSd:          0.69 cm   (0.6-1.1cm)  LVPWd:         0.61 cm   (0.6-1.1cm)  LVIDd:         5.13 cm   (3.9-5.9cm)  LV Mass Index: 57.3 g/m2    LA VOLUME:                                Normal Ranges:  LA Vol A4C:        23.2 ml    (22+/-6mL/m2)  LA Vol A2C:        36.1 ml  LA Vol BP:         30.1 ml  LA Vol Index A4C:  12.2ml/m2  LA Vol Index A2C:  18.9 ml/m2  LA Vol Index BP:   15.8 ml/m2  LA Area A4C:       12.0 cm2  LA Area A2C:       14.4 cm2  LA Major Axis A4C: 5.3 cm  LA Major Axis A2C: 4.9 cm  LA Volume Index:   14.7 ml/m2  LA Vol A4C:        21.2 ml  LA Vol A2C:        34.8 ml    LV SYSTOLIC FUNCTION BY 2D PLANIMETRY (MOD):                      Normal Ranges:  EF-A4C View: 49.6 % (>=55%)  EF-A2C View: 41.0 %  EF-Biplane:  46.4 %    LV DIASTOLIC FUNCTION:                         Normal Ranges:  MV Peak E:    0.73 m/s (0.7-1.2 m/s)  MV Peak A:    0.87 m/s (0.42-0.7 m/s)  E/A Ratio:    0.84     (1.0-2.2)  MV e'         0.06 m/s (>8.0)  MV lateral e' 0.06 m/s  MV medial e'  0.08 m/s  E/e' Ratio:   12.86    (<8.0)  a'            0.08 m/s    MITRAL VALVE:                  Normal Ranges:  MV DT: 216 msec (150-240msec)    AORTIC VALVE:                          Normal Ranges:  AoV Vmax:      1.36 m/s (<=1.7m/s)  AoV Peak P.4 mmHg (<20mmHg)  LVOT Max Melvin:  0.67 m/s (<=1.1m/s)  LVOT Diameter: 1.90 cm  (1.8-2.4cm)  AoV Area,Vmax: 1.40 cm2 (2.5-4.5cm2)    TRICUSPID VALVE/RVSP:                               Normal Ranges:  Peak TR Velocity: 2.06 m/s  RV Syst Pressure: 20.0 mmHg (< 30mmHg)  IVC Diam:         1.78 cm       [id number] Pascual MD  Electronically signed on 1/22/2024 at 10:52:13 AM       ** Final **  Cardiac catheterization - coronary             [facility]  [address] [place], [place] [address]             Phone [phone]    Cardiovascular Catheterization Report    Patient Name:      MARCELINO CHAUDHRY       Performing           [id number] Valdo Pascual                                         Physician:           MD  Study Date:        1/21/2024           Verifying Physician: Ramakrishna Pascual MD  MRN/PID:           [id number]            Cardiologist:  Accession#:        [id number]        Ordering Provider:   Ramakrishna PASCUAL  Date of Birth/Age: 1950 / 73      Fellow:                     years  Gender:            F                   Fellow:  Encounter#:        [id number]       Study:            Left Heart Cath  Additional Study: PCI - Percutaneous Coronary Intervention       Indications:  MARCELINO CHAUDHRY is a 74 year old female who presents with coronary artery disease, dyslipidemia, hypertension, obesity and a chest pain assessment of typical angina. New onset angina <=2 months, worsening angina, suspected coronary artery disease and NSTE - ACS.     Appropriate Use Criteria:  Non ST elevation myocardial infarction with high risk score; AUC score = 9.  Stress test performed: No. CTA performed: No. Dale accessed: No. LVEF  Assessed: No.  Cardiac arrest: No.  Cardiac surgical consult: No.  Cardiovascular Instability: Yes. Persistent ischemic symptoms.  Frailty status of patient entering lab: 6 = Moderately frail.       Procedure Description:  After infiltration with 2% Lidocaine, the right femoral artery was cannulated with a modified Seldinger technique. Subsequently a 6 Telugu sheath was placed retrograde in the right  femoral artery. Selective coronary catheterization was performed using a 6 Fr catheter(s) exchanged over a guide wire to cannulate the coronary arteries. A 4 tip catheter was used for left coronary injections.  Additional catheter(s) used to visualize the coronary arteries were: AR2 Mod. After completion of the procedure, femoral artery angiography was performed. This demonstrated a common femoral artery puncture appropriate for closure. An Angio-Seal Evolution 6F ([employer] [employer]) vascular closure device was placed per protocol.     Coronary Angiography:  The coronary circulation is co-dominant.     Left Main Coronary Artery:  The left main coronary artery is a normal caliber vessel. The left main arises normally from the left coronary sinus of Valsalva and bifurcates into the LAD and circumflex coronary arteries. The left main coronary artery showed no significant disease or stenosis greater than 30%.     Left Anterior Descending Coronary Artery Distribution:  The left anterior descending coronary artery is a normal caliber vessel. The LAD arises normally from the left main coronary artery. The LAD demonstrated no significant disease or stenosis greater than 30%.     Circumflex Coronary Artery Distribution:  The circumflex coronary artery is a medium-sized caliber vessel. The circumflex arises normally from the left main coronary artery and terminates in the AV groove. The circumflex revealed severe obstruction and severe proximal to mid obstruction. The proximal to mid circumflex coronary artery showed 95% stenosis. This lesion was concentric. Lesion length was estimated at 12 mm. This lesion determined to be suitable for percutaneous coronary intervention and suitable for coronary stenting.     Right Coronary Artery Distribution:    The right coronary artery is a normal caliber vessel. The RCA arises normally from the right sinus of Valsalva. The RCA showed no significant disease or stenosis greater than 30%.        Left Ventriculography:  The LV ejection fraction was 50 to 55%.     Coronary Interventions:  Angiography reveals a 95% stenosis of the proximal to mid circumflex coronary artery. Pre-intervention VIVIENNE flow was 1. Percutaneous coronary intervention was performed within the proximal to mid circumflex. The vessel was pre-dilated using a compliant balloon 2.25 mm x 12 mm at 10 CARL. Resolute Ravindra drug-eluting stent 2.5 mm x 22 mm was advanced to the lesion and implanted at 16 CARL. The stenosis was successfully reduced from 95% to 0%. Post-intervention VIVIENNE flow was 3. We used a 6 Telugu CLS 3.5 sideholes guide catheter. Great catheter was placed in left main. After that PT graphic wire was placed in distal large obtuse marginal branch. Over the wire first we applied 2.5 x 12 mm compliant balloon deployed for nominal pressure after that we applied 2.5 x 22 mm long drug-eluting stents deployed lesion site at the bifurcation of mid LCx and OM branch. Post PA CI excellent flows and distal obtuse marginal branch. No jailing of circumflex artery also. Patient received about 6000 units of   heparin and Cath Lab as well as all 90 mg Brilinta in the Cath Lab. Sheath was removed and Angio-Seal applied to right groin. Patient left the Cath Lab with stable condition. Patient also received about 600 mics of intracoronary as well as femoral artery nitroglycerin infusion.     Coronary Lesion Summary:  Vessel       Stenosis   Vessel Segment  Length (mm)  Circumflex 95% stenosis proximal to mid     12       Hemo Personnel:  +------------------+---------+  Name              Duty       +------------------+---------+  Valdo Alas MD 1  +------------------+---------+       Hemodynamic Pressures:     +----+---------------+----------+-------------+--------------+-------+---------+  Site   Date Time   Phase NameSystolic mmHgDiastolic mmHgED mmHgMean  mmHg  +----+---------------+----------+-------------+--------------+-------+---------+    AO      1/21/2024   O2 REST           91            53              69          10:50:36 AM                                                       +----+---------------+----------+-------------+--------------+-------+---------+    LV      1/21/2024   O2 REST           93             5     20                   10:55:26 AM                                                       +----+---------------+----------+-------------+--------------+-------+---------+    LV      1/21/2024   O2 REST           91             5     19                   10:55:40 AM                                                       +----+---------------+----------+-------------+--------------+-------+---------+   LVp      1/21/2024   O2 REST           98             4     20                   10:55:59 AM                                                       +----+---------------+----------+-------------+--------------+-------+---------+       Complications:  No in-lab complications observed.     Cardiac Cath Post Procedure Notes:  Post Procedure Diagnosis: Single vessel disease.  Blood Loss:               Estimated blood loss during the procedure was 0 mls.  Specimens Removed:        Number of specimen(s) removed: none.       Recommendations:  Maximize medical therapy.  Agressive risk factor modification efforts.  Anti-platelet therapy with Aspirin and Ticagrelor 90mgs BID.  Lipid lowering agent or Statin therapy.  Consider referral to cardiac rehabilitation.  Aspirin therapy.  Beta blocker therapy.  Angiotensin receptor blocker (ARB) for LV systolic dysfunction.    ____________________________________________________________________________________  CONCLUSIONS:   1. We used a 6 [place] CLS 3.5 sideholes guide catheter. Great catheter was placed in left main. After that PT graphic wire was placed  in distal large obtuse marginal branch. Over the wire first we applied 2.5 x 12 mm compliant balloon deployed for nominal pressure after that we applied 2.5 x 22 mm long drug-eluting stents deployed lesion site at the bifurcation of mid LCx and OM branch. Post PA CI excellent flows and distal obtuse marginal branch. No jailing of circumflex artery also. Patient received about 6000 units of heparin and Cath Lab as well as all 90 mg Brilinta in the Cath Lab. Sheath was removed and Angio-Seal applied to right groin. Patient left the Cath Lab with stable condition. Patient also received about 600 mics of intracoronary as well as femoral artery nitroglycerin infusion.    ICD 10 Codes:  Atherosclerosic heart disease of native coronary artery with refractory angina pectoris-I25.112; Non ST elevation (NSTEMI) myocardial infarction-I21.4; Subsequent non ST elevation (NSTEMI) myocardial infarction-I22.2     CPT Codes:  Left Heart Cath (visualization of coronaries) and LV-98854; Stent w angioplasty Left Circumflex single major Artery branch (PCI)-95502.     [id number] Valdo Alas MD  Performing Physician  Electronically signed by [id number] Valdo Alas MD on 1/22/2024 at 10:29:14 AM         ** Final **  Electrocardiogram, 12-lead PRN ACS symptoms  Normal sinus rhythm  Septal infarct (cited on or before 20-JAN-2024)  Abnormal ECG  When compared with ECG of 20-JAN-2024 13:38, (unconfirmed)  Serial changes of Septal infarct Present  ECG 12 lead  Normal sinus rhythm  Low voltage QRS  ST elevation, consider lateral injury or acute infarct  ** ** ACUTE MI / STEMI ** **  Abnormal ECG  When compared with ECG of 20-JAN-2024 13:50, (unconfirmed)  No significant change was found  ECG 12 lead  Normal sinus rhythm  T wave abnormality, consider lateral ischemia  Prolonged QT  Abnormal ECG  When compared with ECG of 21-JAN-2024 04:15, (unconfirmed)  T wave inversion now evident in Anterolateral leads    Last Labs:  CBC - 1/22/2024:  5:07 AM  6.7  13.0 143    40.7      CMP - 1/22/2024:  5:07 AM  9.3 7.8 24 --- 0.3   3.6 4.0 20 103      PTT - 1/21/2024:  3:31 PM  1.1   11.3 32.0     Inpatient Medications:  Scheduled medications   Medication Dose Route Frequency    aspirin  81 mg oral Daily    atorvastatin  80 mg oral Nightly    [START ON 1/23/2024] bisoprolol  2.5 mg oral Daily    levothyroxine  75 mcg oral Once per day on Mon Thu    lisinopril  2.5 mg oral Daily    [Held by provider] lisinopril  5 mg oral Daily    mirtazapine  7.5 mg oral Nightly    perflutren lipid microspheres  0.5-10 mL of dilution intravenous Once in imaging    perflutren protein A microsphere  0.5 mL intravenous Once in imaging    rivaroxaban  15 mg oral BID with meals    Followed by    [START ON 2/12/2024] rivaroxaban  20 mg oral Daily with breakfast    sulfur hexafluoride microsphr  2 mL intravenous Once in imaging    ticagrelor  90 mg oral BID     Principal Problem:    NSTEMI (non-ST elevated myocardial infarction) (CMS/[facility])  Active Problems:    Hypertension    Hyperlipidemia    Pulmonary embolism (CMS/[facility])    Assessment/Plan   Patient has above continue guideline directed medical therapy for non-ST elevation MI.  Patient remained hypotensive as well as bradycardic.  Not a candidate for ACE or beta-blocker due to blood pressure remained below 90 systolic as well as heart rate remained sometimes 40s and 50s.  Outpatient follow-up.  Cardiac rehab outpatient.  Critical care time is spent at bedside includes review of diagnostic tests, labs, and radiographs, serial assessments and management of hemodynamics, EKGs, old echoes, cardiac work-up and coordination of care.  Assessment, impression and plans are reflected in the note above as well as the orders.    Code Status:  Full Code  I spent 30 minutes in the professional and overall care of this patient.  Valdo Alas MD

## 2024-01-22 NOTE — NURSING NOTE
Stent education/card/handout given to patient. Discussed importance of taking medication as prescribed/following up with physician appointments. NSTEMI education/handout given to patient. Discussed benefit of heart healthy diet/importance of maintaining a healthy weight. Discussed cardiac rehab. Cardiac rehab staff will contact pt following discharge for enrollment in program.

## 2024-01-22 NOTE — CARE PLAN
Problem: Safety  Goal: Patient will be injury free during hospitalization  Outcome: Progressing  Goal: I will remain free of falls  Outcome: Progressing  Flowsheets (Taken 1/21/2024 2246)  Resident will remain free of falls:   Utilize fall response kit   Apply bed/chair alarms as appropriate   Assist with toileting as orderd   Maintain bed at position as ordered (chair height, low bed)   Consider camera monitoring   Assess and monitor medications that may increase fall risk   Use gait belt for all transfers   Accompany resident as ordered (ex. 1:1, stand-by assist, dayroom monitoring, 15 minute checks, line of sight)   Visual checks per facility policy   Consider transfer to room close to nurses' station   Consult with physical therapy as needed     Problem: Daily Care  Goal: Daily care needs are met  Outcome: Progressing  Flowsheets (Taken 1/21/2024 2246)  Daily care needs are met:   Assess and monitor ability to perform self care and identify potential discharge needs   Assist patient with activities of daily living as needed   Provide mouth care   Include patient/family/caregiver in decisions related to daily care   Encourage independent activity per ability   Assess skin integrity/risk for skin breakdown     Problem: Psychosocial Needs  Goal: Demonstrates ability to cope with hospitalization/illness  Outcome: Progressing  Flowsheets (Taken 1/21/2024 2246)  Demonstrates ability to cope with hospitalization/illness:   Encourage verbalization of feelings/concerns/expectations   Encourage resident to set and complete small goals for self   Encourage participation in diversional activities   Assist resident to identify and practice own strengths and abilities   Provide low-stimulation environment as needed   Reinforce positive adaptation of new coping behaviors   Include resident/family/caregiver in decisions related to psychosocial needs  Goal: Collaborate with me, my family, and caregiver to identify my specific  goals  Outcome: Progressing     Problem: Discharge Barriers  Goal: My discharge needs are met  Outcome: Progressing  Flowsheets (Taken 1/21/2024 8146)  Resident's discharge needs are met:   Identify potential discharge barriers on admission and throughout stay   Involve resident/family/caregiver in discharge planning process   The patient's goals for the shift include      The clinical goals for the shift include pt reports no chest pain

## 2024-01-22 NOTE — NURSING NOTE
Patient became hypotensive and bradycardic. Patient asymptomatic. Secure chat sent to Dr. Zacarias. Dr. Zacarias on unit. States to hold lisinopril. Patient received bisoprolol at 1528.

## 2024-01-22 NOTE — CARE PLAN
The patient's goals for the shift include  return to performing ADLs and go home.    The clinical goals for the shift include chest pain-free and transfer out of ICU.      Problem: Pain  Goal: My pain/discomfort is manageable  Outcome: Progressing     Problem: Safety  Goal: Patient will be injury free during hospitalization  Outcome: Progressing  Goal: I will remain free of falls  Outcome: Progressing     Problem: Daily Care  Goal: Daily care needs are met  Outcome: Progressing     Problem: Psychosocial Needs  Goal: Demonstrates ability to cope with hospitalization/illness  Outcome: Progressing  Goal: Collaborate with me, my family, and caregiver to identify my specific goals  Outcome: Progressing     Problem: Discharge Barriers  Goal: My discharge needs are met  Outcome: Progressing

## 2024-01-23 LAB
ATRIAL RATE: 98 BPM
P AXIS: 78 DEGREES
P OFFSET: 197 MS
P ONSET: 148 MS
PR INTERVAL: 146 MS
Q ONSET: 221 MS
QRS COUNT: 16 BEATS
QRS DURATION: 76 MS
QT INTERVAL: 342 MS
QTC CALCULATION(BAZETT): 436 MS
QTC FREDERICIA: 402 MS
R AXIS: 75 DEGREES
T AXIS: 66 DEGREES
T OFFSET: 392 MS
VENTRICULAR RATE: 98 BPM

## 2024-01-24 LAB
ATRIAL RATE: 83 BPM
P AXIS: 67 DEGREES
P OFFSET: 204 MS
P ONSET: 149 MS
PR INTERVAL: 152 MS
Q ONSET: 225 MS
QRS COUNT: 14 BEATS
QRS DURATION: 72 MS
QT INTERVAL: 386 MS
QTC CALCULATION(BAZETT): 453 MS
QTC FREDERICIA: 430 MS
R AXIS: 40 DEGREES
T AXIS: 27 DEGREES
T OFFSET: 418 MS
VENTRICULAR RATE: 83 BPM

## 2024-01-25 LAB
ATRIAL RATE: 62 BPM
P AXIS: 75 DEGREES
P OFFSET: 193 MS
P ONSET: 148 MS
PR INTERVAL: 148 MS
Q ONSET: 222 MS
QRS COUNT: 10 BEATS
QRS DURATION: 76 MS
QT INTERVAL: 476 MS
QTC CALCULATION(BAZETT): 483 MS
QTC FREDERICIA: 481 MS
R AXIS: 61 DEGREES
T AXIS: 110 DEGREES
T OFFSET: 460 MS
VENTRICULAR RATE: 62 BPM

## 2024-02-13 DIAGNOSIS — I25.110 CORONARY ARTERY DISEASE INVOLVING NATIVE CORONARY ARTERY OF NATIVE HEART WITH UNSTABLE ANGINA PECTORIS (MULTI): ICD-10-CM

## 2024-02-13 DIAGNOSIS — I21.4 NSTEMI (NON-ST ELEVATED MYOCARDIAL INFARCTION) (MULTI): ICD-10-CM

## 2024-02-13 NOTE — TELEPHONE ENCOUNTER
Requested Prescriptions     Pending Prescriptions Disp Refills    atorvastatin (Lipitor) 80 mg tablet 90 tablet 3     Sig: Take 1 tablet (80 mg) by mouth once daily at bedtime.    bisoprolol (Zebeta) 5 mg tablet 45 tablet 3     Sig: Take 0.5 tablets (2.5 mg) by mouth once daily.    ticagrelor (Brilinta) 90 mg tablet 180 tablet 3     Sig: Take 1 tablet (90 mg) by mouth 2 times a day.    lisinopril 2.5 mg tablet 90 tablet 3     Sig: Take 1 tablet (2.5 mg) by mouth once daily.     Please send a refill of patient's medication as soon as possible.    Thank you.

## 2024-02-14 RX ORDER — ATORVASTATIN CALCIUM 80 MG/1
80 TABLET, FILM COATED ORAL NIGHTLY
Qty: 90 TABLET | Refills: 3 | Status: SHIPPED | OUTPATIENT
Start: 2024-02-14 | End: 2025-02-08

## 2024-02-14 RX ORDER — LISINOPRIL 2.5 MG/1
2.5 TABLET ORAL DAILY
Qty: 90 TABLET | Refills: 3 | Status: SHIPPED | OUTPATIENT
Start: 2024-02-14 | End: 2025-02-08

## 2024-02-14 RX ORDER — BISOPROLOL FUMARATE 5 MG/1
2.5 TABLET, FILM COATED ORAL DAILY
Qty: 45 TABLET | Refills: 3 | Status: SHIPPED | OUTPATIENT
Start: 2024-02-14 | End: 2025-02-08

## 2024-02-15 PROBLEM — F51.04 CHRONIC INSOMNIA: Status: ACTIVE | Noted: 2023-09-15

## 2024-02-15 RX ORDER — CLONAZEPAM 0.5 MG/1
TABLET ORAL
COMMUNITY
Start: 2023-02-16 | End: 2024-02-27 | Stop reason: WASHOUT

## 2024-02-27 ENCOUNTER — OFFICE VISIT (OUTPATIENT)
Dept: CARDIOLOGY | Facility: CLINIC | Age: 74
End: 2024-02-27
Payer: MEDICARE

## 2024-02-27 VITALS
TEMPERATURE: 98.9 F | RESPIRATION RATE: 16 BRPM | DIASTOLIC BLOOD PRESSURE: 86 MMHG | BODY MASS INDEX: 30.73 KG/M2 | HEART RATE: 78 BPM | SYSTOLIC BLOOD PRESSURE: 149 MMHG | WEIGHT: 180 LBS | HEIGHT: 64 IN | OXYGEN SATURATION: 97 %

## 2024-02-27 DIAGNOSIS — R06.02 SHORTNESS OF BREATH: ICD-10-CM

## 2024-02-27 DIAGNOSIS — I10 PRIMARY HYPERTENSION: ICD-10-CM

## 2024-02-27 DIAGNOSIS — D68.59 PROTEIN S DEFICIENCY (MULTI): ICD-10-CM

## 2024-02-27 DIAGNOSIS — I21.4 NSTEMI (NON-ST ELEVATED MYOCARDIAL INFARCTION) (MULTI): Primary | ICD-10-CM

## 2024-02-27 DIAGNOSIS — E78.2 MIXED HYPERLIPIDEMIA: ICD-10-CM

## 2024-02-27 PROCEDURE — 1126F AMNT PAIN NOTED NONE PRSNT: CPT | Performed by: INTERNAL MEDICINE

## 2024-02-27 PROCEDURE — 3077F SYST BP >= 140 MM HG: CPT | Performed by: INTERNAL MEDICINE

## 2024-02-27 PROCEDURE — 1036F TOBACCO NON-USER: CPT | Performed by: INTERNAL MEDICINE

## 2024-02-27 PROCEDURE — 99214 OFFICE O/P EST MOD 30 MIN: CPT | Performed by: INTERNAL MEDICINE

## 2024-02-27 PROCEDURE — 1159F MED LIST DOCD IN RCRD: CPT | Performed by: INTERNAL MEDICINE

## 2024-02-27 PROCEDURE — 3079F DIAST BP 80-89 MM HG: CPT | Performed by: INTERNAL MEDICINE

## 2024-02-27 RX ORDER — CLOPIDOGREL BISULFATE 75 MG/1
75 TABLET ORAL DAILY
Qty: 30 TABLET | Refills: 11 | Status: SHIPPED | OUTPATIENT
Start: 2024-02-27 | End: 2025-02-26

## 2024-02-27 ASSESSMENT — PAIN SCALES - GENERAL: PAINLEVEL: 0-NO PAIN

## 2024-02-27 ASSESSMENT — PATIENT HEALTH QUESTIONNAIRE - PHQ9
2. FEELING DOWN, DEPRESSED OR HOPELESS: NOT AT ALL
1. LITTLE INTEREST OR PLEASURE IN DOING THINGS: NOT AT ALL
SUM OF ALL RESPONSES TO PHQ9 QUESTIONS 1 AND 2: 0

## 2024-02-27 NOTE — PROGRESS NOTES
Subjective   Chief Complaint   Patient presents with    Hospital Follow-up     Mrs\Ms. Young is present for her Hosp Follow up after stent placement. Pt wants to discuss concerns, she has been SOB, nose bleeds and blood in urine. Its in pt chart she has PE while in hospital per PCP and no one made her aware of this. Would like a second opinion.       74-year-old female patient with a history of protein C deficiency with a hypercoagulable state.  Patient status post non-ST elevation MI in January.  Patient and according to coagulation with Xarelto.  Patient had month ago diagnostic catheterization for non-STEMI and PCI of left circumflex artery done  Patient does complain of hematuria and on triple blood thinners including aspirin, Brilinta and Xarelto.  Patient does complain of bloody nose and bruising and other issue for minor bleeding.  Patient denies any chest pain tightness of cardiac wise.  Patient does admit to having an episode of short of breath worsening since starting on the Brilinta.  Patient had CT scan showed essentially and no obvious PE but some segment segmental has a slow flow with a poor contrast with possible PE.  Patient did had a diagnostic catheterization at that time also patient had a slow flow in the coronary artery which could be contributing further abnormal CT scan results.    Past Medical History:   Diagnosis Date    COPD (chronic obstructive pulmonary disease) (CMS/[facility])     Hyperlipidemia     Hypertension     NSTEMI (non-ST elevated myocardial infarction) (CMS/[facility]) 01/20/2024    Pulmonary embolism (CMS/[facility]) 01/22/2024     Past Surgical History:   Procedure Laterality Date    CARDIAC CATHETERIZATION N/A 1/21/2024    Procedure: Left Heart Cath;  Surgeon: Valdo Alas MD;  Location: [facility] Cardiac Cath Lab;  Service: Cardiovascular;  Laterality: N/A;    CARDIAC CATHETERIZATION N/A 1/21/2024    Procedure: PCI;  Surgeon: Valdo Alas MD;  Location: [facility] Cardiac Cath Lab;  Service:  Cardiovascular;  Laterality: N/A;    CHOLECYSTECTOMY       No relevant family history has been documented for this patient.  Current Outpatient Medications   Medication Sig Dispense Refill    aspirin 81 mg chewable tablet Chew and swallow 1 tablet (81 mg) by mouth once daily. Do not start before January 23, 2024. 30 tablet 0    atorvastatin (Lipitor) 80 mg tablet Take 1 tablet (80 mg) by mouth once daily at bedtime. 90 tablet 3    bisoprolol (Zebeta) 5 mg tablet Take 0.5 tablets (2.5 mg) by mouth once daily. 45 tablet 3    levothyroxine (Synthroid, Levoxyl) 50 mcg tablet Take 1 tablet (50 mcg) by mouth once daily in the morning. Take before meals. Wednesday thru sunday      levothyroxine (Synthroid, Levoxyl) 75 mcg tablet Take 1 tablet (75 mcg) by mouth 2 times a week. Monday and tuesday      lisinopril 2.5 mg tablet Take 1 tablet (2.5 mg) by mouth once daily. 90 tablet 3    mirtazapine (Remeron) 7.5 mg tablet Take 1 tablet (7.5 mg) by mouth once daily at bedtime.      rivaroxaban (Xarelto) 20 mg tablet Take 1 tablet (20 mg) by mouth once daily in the evening. Take with meals.      rOPINIRole (Requip) 0.25 mg tablet Take 1 tablet (0.25 mg) by mouth once daily as needed.      ticagrelor (Brilinta) 90 mg tablet Take 1 tablet (90 mg) by mouth 2 times a day. 180 tablet 3    clonazePAM (KlonoPIN) 0.5 mg tablet TAKE ONE TABLET BY MOUTH ONE TIME DAILY AS NEEDED       No current facility-administered medications for this visit.      reports that she has quit smoking. Her smoking use included cigarettes. She has never been exposed to tobacco smoke. She has never used smokeless tobacco. She reports current alcohol use of about 14.0 standard drinks of alcohol per week. She reports that she does not use drugs.  Patient has no known allergies.  * No diagnoses found *    Vitals:    02/27/24 0913   BP: 149/86   Pulse: 78   Resp: 16   Temp: 37.2 °C (98.9 °F)   TempSrc: Core   SpO2: 97%   Weight: 81.6 kg (180 lb)   Height: 1.626 m  "(5' 4\")   PainSc: 0-No pain      BMI:Body mass index is 30.9 kg/m².   General Cardiology:  General Appearance: Alert, oriented and in no acute distress.  HEENT: extra ocular movements intact (EOMI), pupils equal,  round, reactive to light and accommodation (PERRLA).  Carotid Upstroke: no bruit, normal.  Jugular Venous Distention (JVD): flat.  Chest: normal.  Lungs: Clear to auscultation,   Heart Sounds: no S3 or S4, normal S1, S2, regular rate.  Murmur, Click, Gallop: no systolic murmur.  Abdomen: no hepatomegaly, no masses felt, soft.  Extremities: no leg edema.  Peripheral pulses: 2 plus bilateral.  NEUROLOGY Cranial nerves II-XII grossly intact.     Patient Active Problem List   Diagnosis    NSTEMI (non-ST elevated myocardial infarction) (CMS/HCC)    Hypothyroidism, acquired    Protein S deficiency (CMS/HCC)    Hypertension    Hyperlipidemia    Pulmonary embolism (CMS/HCC)    Chronic insomnia       Problem List Items Addressed This Visit    None     74-year-old female patient with a history of CAD, hypertension hyperlipidemia history of protein S deficiency in the past currently on Xarelto.  1.  CAD: Patient does have underlying history of stents in the circumflex artery.  Does complain of short of breath.  Will switch from Brilinta to Plavix.  2.  Shortness of breath: Which could be due to underlying COPD as well as Brilinta.  Now switched to Plavix.  3.  Bruising, nosebleed: Will discontinue aspirin advised patient continue Plavix as well as Xarelto.  Modify risk factor.  Optimize statin therapy.  Cardiac rehab.    Patient care time is spent at bedside includes review of diagnostic tests, labs, and radiographs, serial assessments and management of hemodynamics, EKGs, old echoes, cardiac work-up and coordination of care.  Assessment, impression and plans are reflected in the note above as well as the orders.    Valdo Alas MD  "

## 2024-09-09 ENCOUNTER — APPOINTMENT (OUTPATIENT)
Dept: CARDIOLOGY | Facility: CLINIC | Age: 74
End: 2024-09-09
Payer: MEDICARE

## 2024-09-09 VITALS
DIASTOLIC BLOOD PRESSURE: 62 MMHG | BODY MASS INDEX: 30.9 KG/M2 | HEIGHT: 64 IN | WEIGHT: 181 LBS | SYSTOLIC BLOOD PRESSURE: 120 MMHG | HEART RATE: 77 BPM | RESPIRATION RATE: 16 BRPM | OXYGEN SATURATION: 91 % | TEMPERATURE: 98 F

## 2024-09-09 DIAGNOSIS — D68.59 PROTEIN S DEFICIENCY (MULTI): ICD-10-CM

## 2024-09-09 DIAGNOSIS — R06.02 SHORTNESS OF BREATH: ICD-10-CM

## 2024-09-09 DIAGNOSIS — E03.9 HYPOTHYROIDISM, ACQUIRED: ICD-10-CM

## 2024-09-09 DIAGNOSIS — I10 PRIMARY HYPERTENSION: Primary | ICD-10-CM

## 2024-09-09 DIAGNOSIS — I25.112 ATHEROSCLEROTIC HEART DISEASE OF NATIVE CORONARY ARTERY WITH REFRACTORY ANGINA PECTORIS (CMS-HCC): ICD-10-CM

## 2024-09-09 DIAGNOSIS — E78.2 MIXED HYPERLIPIDEMIA: ICD-10-CM

## 2024-09-09 DIAGNOSIS — I26.99 PULMONARY EMBOLISM, UNSPECIFIED CHRONICITY, UNSPECIFIED PULMONARY EMBOLISM TYPE, UNSPECIFIED WHETHER ACUTE COR PULMONALE PRESENT (MULTI): ICD-10-CM

## 2024-09-09 DIAGNOSIS — E66.9 OBESITY, CLASS I, BMI 30-34.9: ICD-10-CM

## 2024-09-09 PROBLEM — E66.811 OBESITY, CLASS I, BMI 30-34.9: Status: ACTIVE | Noted: 2024-02-09

## 2024-09-09 PROCEDURE — 3074F SYST BP LT 130 MM HG: CPT | Performed by: INTERNAL MEDICINE

## 2024-09-09 PROCEDURE — 1159F MED LIST DOCD IN RCRD: CPT | Performed by: INTERNAL MEDICINE

## 2024-09-09 PROCEDURE — 3078F DIAST BP <80 MM HG: CPT | Performed by: INTERNAL MEDICINE

## 2024-09-09 PROCEDURE — 1036F TOBACCO NON-USER: CPT | Performed by: INTERNAL MEDICINE

## 2024-09-09 PROCEDURE — 3008F BODY MASS INDEX DOCD: CPT | Performed by: INTERNAL MEDICINE

## 2024-09-09 PROCEDURE — 1126F AMNT PAIN NOTED NONE PRSNT: CPT | Performed by: INTERNAL MEDICINE

## 2024-09-09 PROCEDURE — 99214 OFFICE O/P EST MOD 30 MIN: CPT | Performed by: INTERNAL MEDICINE

## 2024-09-09 PROCEDURE — 1160F RVW MEDS BY RX/DR IN RCRD: CPT | Performed by: INTERNAL MEDICINE

## 2024-09-09 ASSESSMENT — ENCOUNTER SYMPTOMS
DEPRESSION: 0
OCCASIONAL FEELINGS OF UNSTEADINESS: 0
LOSS OF SENSATION IN FEET: 0

## 2024-09-09 ASSESSMENT — PAIN SCALES - GENERAL: PAINLEVEL: 0-NO PAIN

## 2024-09-09 ASSESSMENT — PATIENT HEALTH QUESTIONNAIRE - PHQ9
SUM OF ALL RESPONSES TO PHQ9 QUESTIONS 1 AND 2: 0
1. LITTLE INTEREST OR PLEASURE IN DOING THINGS: NOT AT ALL
2. FEELING DOWN, DEPRESSED OR HOPELESS: NOT AT ALL

## 2024-09-09 ASSESSMENT — LIFESTYLE VARIABLES
HOW OFTEN DO YOU HAVE SIX OR MORE DRINKS ON ONE OCCASION: NEVER
AUDIT TOTAL SCORE: 4
SKIP TO QUESTIONS 9-10: 1
HOW MANY STANDARD DRINKS CONTAINING ALCOHOL DO YOU HAVE ON A TYPICAL DAY: 1 OR 2
AUDIT-C TOTAL SCORE: 4
HAS A RELATIVE, FRIEND, DOCTOR, OR ANOTHER HEALTH PROFESSIONAL EXPRESSED CONCERN ABOUT YOUR DRINKING OR SUGGESTED YOU CUT DOWN: NO
HOW OFTEN DO YOU HAVE A DRINK CONTAINING ALCOHOL: 4 OR MORE TIMES A WEEK
HAVE YOU OR SOMEONE ELSE BEEN INJURED AS A RESULT OF YOUR DRINKING: NO

## 2024-09-09 NOTE — PROGRESS NOTES
Subjective   Chief Complaint   Patient presents with    Follow-up     Mrs\ Ms. Young is present for her 6 month Follow up with Dr. Alas        74-year-old female patient with history of hypertension hyper history of protein C deficiency with hypercoagulable state.  Status post non-ST elation MI in January of this year about 8 months ago had.  Patient was sent Xarelto anticoagulation.  Patient had a PCI of the left circumflex artery was done.  So far stable cardiac wise.  Did have a frequent episode of nosebleed as well as bruising in the past.  No active chest pain tightness.  Had episode of short of breath dyspnea exertion with Brilinta.  Now stable cardiac wise.  Patient so far has improvement of bleeding.  Currently only on a Plavix and Xarelto.  Patient had renal panel done about 7 months ago essentially unremarkable.  BMP unremarkable CBC also was unremarkable.  Lipid profile was done in January essentially showed total cholesterol 214 the LDL is 131 with a triglyceride 142.  Past Medical History:   Diagnosis Date    COPD (chronic obstructive pulmonary disease) (Multi)     Hyperlipidemia     Hypertension     NSTEMI (non-ST elevated myocardial infarction) (Multi) 01/20/2024    Pulmonary embolism (Multi) 01/22/2024     Past Surgical History:   Procedure Laterality Date    CARDIAC CATHETERIZATION N/A 1/21/2024    Procedure: Left Heart Cath;  Surgeon: Valdo Alas MD;  Location: [facility] Cardiac Cath Lab;  Service: Cardiovascular;  Laterality: N/A;    CARDIAC CATHETERIZATION N/A 1/21/2024    Procedure: PCI;  Surgeon: Valdo Alas MD;  Location: [facility] Cardiac Cath Lab;  Service: Cardiovascular;  Laterality: N/A;    CHOLECYSTECTOMY       No relevant family history has been documented for this patient.  Current Outpatient Medications   Medication Sig Dispense Refill    atorvastatin (Lipitor) 80 mg tablet Take 1 tablet (80 mg) by mouth once daily at bedtime. 90 tablet 3    bisoprolol (Zebeta) 5 mg tablet Take 0.5  "tablets (2.5 mg) by mouth once daily. 45 tablet 3    clopidogrel (Plavix) 75 mg tablet Take 1 tablet (75 mg) by mouth once daily. 30 tablet 11    levothyroxine (Synthroid, Levoxyl) 50 mcg tablet Take 1 tablet (50 mcg) by mouth once daily in the morning. Take before meals. Wednesday thru sunday      levothyroxine (Synthroid, Levoxyl) 75 mcg tablet Take 1 tablet (75 mcg) by mouth 2 times a week. Monday and tuesday      lisinopril 2.5 mg tablet Take 1 tablet (2.5 mg) by mouth once daily. 90 tablet 3    rivaroxaban (Xarelto) 20 mg tablet Take 1 tablet (20 mg) by mouth once daily in the evening. Take with meals.      rOPINIRole (Requip) 0.25 mg tablet Take 1 tablet (0.25 mg) by mouth once daily as needed.       No current facility-administered medications for this visit.      reports that she quit smoking about 15 years ago. Her smoking use included cigarettes. She has never been exposed to tobacco smoke. She has never used smokeless tobacco. She reports that she does not currently use alcohol after a past usage of about 7.0 standard drinks of alcohol per week. She reports that she does not use drugs.  Patient has no known allergies.  Primary hypertension    Vitals:    09/09/24 0937   BP: 120/62   Pulse: 77   Resp: 16   Temp: 36.7 °C (98 °F)   TempSrc: Core   SpO2: 91%   Weight: 82.1 kg (181 lb)   Height: 1.626 m (5' 4\")   PainSc: 0-No pain      BMI:Body mass index is 31.07 kg/m².   General Cardiology:  General Appearance: Alert, oriented and in no acute distress.  HEENT: extra ocular movements intact (EOMI), pupils equal,  round, reactive to light and accommodation (PERRLA).  Carotid Upstroke: no bruit, normal.  Jugular Venous Distention (JVD): flat.  Chest: normal.  Lungs: Clear to auscultation,   Heart Sounds: no S3 or S4, normal S1, S2, regular rate.  Murmur, Click, Gallop: no systolic murmur.  Abdomen: no hepatomegaly, no masses felt, soft.  Extremities: no leg edema.  Peripheral pulses: 2 plus bilateral.  NEUROLOGY " Cranial nerves II-XII grossly intact.     Patient Active Problem List   Diagnosis    NSTEMI (non-ST elevated myocardial infarction) (Multi)    Hypothyroidism, acquired    Protein S deficiency (Multi)    Hypertension    Hyperlipidemia    Pulmonary embolism (Multi)    Chronic insomnia    Shortness of breath    Atherosclerotic heart disease of native coronary artery with refractory angina pectoris (CMS-HCC)    Obesity, Class I, BMI 30-34.9       Problem List Items Addressed This Visit       Hypothyroidism, acquired    Protein S deficiency (Multi)    Hypertension - Primary    Hyperlipidemia    Pulmonary embolism (Multi)    Shortness of breath    Atherosclerotic heart disease of native coronary artery with refractory angina pectoris (CMS-HCC)    Obesity, Class I, BMI 30-34.9      74-year-old female with a history of PCI of left circumflex artery.  So far stable cardiac wise no active chest pain tightness.  Does have periodically shortness of breath.  1.  CAD: Currently on dual antiplatelet therapy including anticoagulation therapy.  Patient currently on Plavix 75 mg once a day.  As well as Xarelto.  2.  Hypercoagulable state: Patient currently on Xarelto 20 mg tablet daily due to underlying protein S deficiency.  History of PE in the past.  Avoid aspirin to excessive bleeding risk as well as bruising both upper extremities.  3.  Hypertension: Patient currently on bisoprolol as well as lisinopril guideline directed medical therapy so far stable blood pressure at the moment.  4.  Hyperlipidemia: Currently on Lipitor or atorvastatin 80 mg tablet daily.  Stable lipid profile will order repeat BMP as well as lipid profile on patient.    Advised patient to avoid lunch meats, canned soups, pizzas, bread rolls, and sandwiches. Advised patient to limit salt intake 1,500 mg daily. Advised patient to exercise 30 mins/3 times a week including treadmill or aerobic type, Goal to achieve 65% target HR.    Diet and exercise reviewed  with patient..advice to walk about 10,000 steps or about 2 hours during day time. Cut back on salt, sugar and flour.    Pt. care time is spent includes review of diagnostic tests, labs, radiographs, EKGs, old echoes, cardiac work-up and coordination of care. Assessment, impression and plans are reflected in the note above as well as the orders.    Valdo Alas MD

## 2024-09-25 ENCOUNTER — TELEPHONE (OUTPATIENT)
Dept: CARDIOLOGY | Facility: CLINIC | Age: 74
End: 2024-09-25
Payer: MEDICARE

## 2024-09-25 NOTE — TELEPHONE ENCOUNTER
Kayla from Dr. Stefany Ray office called for last EKG for surgical clearance.    EKG faxed 09/25/2024 @ [phone].

## 2025-02-03 DIAGNOSIS — I25.110 CORONARY ARTERY DISEASE INVOLVING NATIVE CORONARY ARTERY OF NATIVE HEART WITH UNSTABLE ANGINA PECTORIS: ICD-10-CM

## 2025-02-03 RX ORDER — ATORVASTATIN CALCIUM 80 MG/1
80 TABLET, FILM COATED ORAL NIGHTLY
Qty: 90 TABLET | Refills: 3 | Status: SHIPPED | OUTPATIENT
Start: 2025-02-03

## 2025-02-03 RX ORDER — BISOPROLOL FUMARATE 5 MG/1
TABLET, FILM COATED ORAL
Qty: 45 TABLET | Refills: 3 | Status: SHIPPED | OUTPATIENT
Start: 2025-02-03

## 2025-02-03 NOTE — TELEPHONE ENCOUNTER
LOV: 2024  Up Comin2025    Requested Prescriptions     Pending Prescriptions Disp Refills    bisoprolol (Zebeta) 5 mg tablet [Pharmacy Med Name: Bisoprolol Fumarate Oral Tablet 5 MG] 45 tablet 0     Sig: TAKE 1/2 TABLET (2.5mg) BY MOUTH once DAILY    atorvastatin (Lipitor) 80 mg tablet [Pharmacy Med Name: Atorvastatin Calcium Oral Tablet 80 MG] 90 tablet 0     Sig: TAKE ONE TABLET BY MOUTH once DAILY AT BEDTIME

## 2025-04-28 DIAGNOSIS — I21.4 NSTEMI (NON-ST ELEVATED MYOCARDIAL INFARCTION) (MULTI): ICD-10-CM

## 2025-04-28 RX ORDER — LISINOPRIL 2.5 MG/1
2.5 TABLET ORAL DAILY
Qty: 90 TABLET | Refills: 3 | Status: SHIPPED | OUTPATIENT
Start: 2025-04-28 | End: 2026-04-23

## 2025-05-23 ENCOUNTER — APPOINTMENT (OUTPATIENT)
Age: 75
End: 2025-05-23
Payer: MEDICARE

## (undated) DEVICE — PAD, ELECTRODE DEFIB PADPRO ADULT STRL W/ADAPTER

## (undated) DEVICE — COVER, EQUIPMENT, ZERO GRAVITY

## (undated) DEVICE — GUIDEWIRE, STRAIGHT, INTERMEDIATE, CHOICE PT GRAPHIX, 0.014 IN X 182 CM

## (undated) DEVICE — INTRODUCER, SHEATH, AVANTI PLUS, W/MINI WIRE, STANDARD, 6MS FR, 11 CM

## (undated) DEVICE — CATHETER, BALLOON DILATION, EUPHORA SEMICOMPLIANT 2.50  X 12 MM X 142CM

## (undated) DEVICE — INFLATION KIT, ADVANTAGE, ENCORE 26 (1/BOX)

## (undated) DEVICE — CATHETER, EXPO, MODEL-D, 6FR FL4

## (undated) DEVICE — CATHETER, EXPO, MODEL-D, 6FR PIG 110CM

## (undated) DEVICE — GUIDEWIRE, J TIP, 3 MM, 0.035 IN X 150 CM, PTFE

## (undated) DEVICE — CLOSURE DEVICE, VASCULAR, ANGIO-SEAL, VIP, 6FR, LF

## (undated) DEVICE — PACK, ANGIO P2, CUSTOM, LAKE

## (undated) DEVICE — CATHETER, GUIDE, RUNWAY, CLS3.5 SH, 6FR X 100CM, NO SIDE HOLES, CRV

## (undated) DEVICE — KIT, NAMIC STANDARD LEFT HEART, CUSTOM, LWMC

## (undated) DEVICE — POSITIONER, RETENTION SYSTEM, PANNUS, 2 PADS 1 STRAP, NS

## (undated) DEVICE — CATHETER, EXPO, AMPLATZ, AR2, 6 FR (5 BX)